# Patient Record
Sex: MALE | Race: WHITE | NOT HISPANIC OR LATINO | Employment: OTHER | URBAN - NONMETROPOLITAN AREA
[De-identification: names, ages, dates, MRNs, and addresses within clinical notes are randomized per-mention and may not be internally consistent; named-entity substitution may affect disease eponyms.]

---

## 2018-05-15 ENCOUNTER — APPOINTMENT (OUTPATIENT)
Dept: GENERAL RADIOLOGY | Facility: HOSPITAL | Age: 68
End: 2018-05-15

## 2018-05-15 ENCOUNTER — APPOINTMENT (OUTPATIENT)
Dept: CT IMAGING | Facility: HOSPITAL | Age: 68
End: 2018-05-15

## 2018-05-15 ENCOUNTER — HOSPITAL ENCOUNTER (EMERGENCY)
Facility: HOSPITAL | Age: 68
Discharge: HOME | End: 2018-05-15
Attending: EMERGENCY MEDICINE | Admitting: EMERGENCY MEDICINE

## 2018-05-15 VITALS
HEIGHT: 69 IN | SYSTOLIC BLOOD PRESSURE: 139 MMHG | HEART RATE: 75 BPM | WEIGHT: 186 LBS | DIASTOLIC BLOOD PRESSURE: 69 MMHG | TEMPERATURE: 98 F | BODY MASS INDEX: 27.55 KG/M2 | OXYGEN SATURATION: 95 % | RESPIRATION RATE: 20 BRPM

## 2018-05-15 DIAGNOSIS — S42.334A CLOSED NONDISPLACED OBLIQUE FRACTURE OF SHAFT OF RIGHT HUMERUS, INITIAL ENCOUNTER: Primary | ICD-10-CM

## 2018-05-15 PROCEDURE — 25010000002 TDAP 5-2.5-18.5 LF-MCG/0.5 SUSPENSION: Performed by: EMERGENCY MEDICINE

## 2018-05-15 PROCEDURE — 90715 TDAP VACCINE 7 YRS/> IM: CPT | Performed by: EMERGENCY MEDICINE

## 2018-05-15 PROCEDURE — 99283 EMERGENCY DEPT VISIT LOW MDM: CPT

## 2018-05-15 PROCEDURE — 73060 X-RAY EXAM OF HUMERUS: CPT

## 2018-05-15 PROCEDURE — 72170 X-RAY EXAM OF PELVIS: CPT

## 2018-05-15 PROCEDURE — 70450 CT HEAD/BRAIN W/O DYE: CPT

## 2018-05-15 PROCEDURE — 73030 X-RAY EXAM OF SHOULDER: CPT

## 2018-05-15 PROCEDURE — 90471 IMMUNIZATION ADMIN: CPT | Performed by: EMERGENCY MEDICINE

## 2018-05-15 RX ORDER — OXYCODONE HYDROCHLORIDE AND ACETAMINOPHEN 5; 325 MG/1; MG/1
1 TABLET ORAL EVERY 6 HOURS PRN
Qty: 12 TABLET | Refills: 0 | Status: SHIPPED | OUTPATIENT
Start: 2018-05-15 | End: 2018-08-23

## 2018-05-15 RX ADMIN — TETANUS TOXOID, REDUCED DIPHTHERIA TOXOID AND ACELLULAR PERTUSSIS VACCINE, ADSORBED 0.5 ML: 5; 2.5; 8; 8; 2.5 SUSPENSION INTRAMUSCULAR at 18:25

## 2018-05-15 NOTE — ED NOTES
No signs and symptoms of redness or local reaction noted to injection site.      Senait Malik RN  05/15/18 7304

## 2018-05-15 NOTE — ED PROVIDER NOTES
Subjective   History of Present Illness   67M w/ hx of COPD sent from clinic for further management of fall. Pt fell off ladder of about 12 feet and hit head, neck and R shoulder. Denies any loss of consciousness, subsequent vomiting, memory loss, changes in vision, weakness or numbness in arms or legs or use of blood thinners. However, clinic noted some slurring of speech on their exam. Pt only complains, now, of R shoulder and R groin pain. Last tetanus unknown      Review of Systems   Musculoskeletal: Positive for arthralgias.   Neurological: Positive for headaches.   All other systems reviewed and are negative.      Past Medical History:   Diagnosis Date   • COPD (chronic obstructive pulmonary disease)    • Hypertension        Allergies   Allergen Reactions   • Iodine Solution [Povidone Iodine] Itching       Past Surgical History:   Procedure Laterality Date   • BONE GRAFT Right     around age 12-14   • CORONARY STENT PLACEMENT Right 12/21/2012       History reviewed. No pertinent family history.    Social History     Social History   • Marital status: Single     Social History Main Topics   • Smoking status: Former Smoker   • Alcohol use Yes      Comment: rarely   • Drug use: No     Other Topics Concern   • Not on file           Objective   Physical Exam   Constitutional: He is oriented to person, place, and time. He appears well-developed and well-nourished. No distress.   HENT:   Head: Normocephalic and atraumatic.   Mouth/Throat: Oropharynx is clear and moist.   No hemotympanum, gaona's sign nor raccoon eyes.    Eyes: No scleral icterus.   Neck: Normal range of motion. Neck supple. No tracheal deviation present.   No ttp over entirety of spinous processes.    Cardiovascular: Normal rate, regular rhythm, normal heart sounds and intact distal pulses.  Exam reveals no gallop and no friction rub.    No murmur heard.  Pulmonary/Chest: Effort normal and breath sounds normal. No stridor. No respiratory distress.  He has no wheezes. He has no rales. He exhibits no tenderness.   Abdominal: Soft. He exhibits no distension and no mass. There is no tenderness. There is no rebound and no guarding.   Musculoskeletal: He exhibits tenderness (R anterior shoulder ttp. ). He exhibits no deformity.   Range of motion of the right shoulder is limited to about 30° of abduction.  Tenderness palpation of the right anterior shoulder as well as the proximal humerus.  Mild pain in the right groin with flexion of the right hip.  Otherwise no tenderness palpation or pain with range of motion of bilateral upper and lower extremities.   Neurological: He is alert and oriented to person, place, and time.   Skin: Skin is warm and dry. No rash noted. He is not diaphoretic. No erythema. No pallor.   Psychiatric: He has a normal mood and affect. His behavior is normal.   Nursing note and vitals reviewed.      Procedures           ED Course  ED Course                  MDM  67-year-old male here after fall.  Complains only of pain in the right shoulder as well as right groin.  Given age and height of fall, will get a CT scan of the head, x-rays of the right shoulder, humerus, pelvis, update tetanus, and reassess.    5:57 PM Xrays show only midshaft humerus fracture, non-displaced. Awaiting CT head.     6:25 PM CT head unremarkable. Will place in a sling and have pt f/u w/ orthopedics as needed for reassessment. Discussed return to care precautions.     Final diagnoses:   Closed nondisplaced oblique fracture of shaft of right humerus, initial encounter            Torin Porter MD  05/15/18 5002

## 2018-08-23 ENCOUNTER — APPOINTMENT (OUTPATIENT)
Dept: PREADMISSION TESTING | Facility: HOSPITAL | Age: 68
End: 2018-08-23

## 2018-08-23 ENCOUNTER — HOSPITAL ENCOUNTER (OUTPATIENT)
Dept: GENERAL RADIOLOGY | Facility: HOSPITAL | Age: 68
Discharge: HOME | End: 2018-08-23
Attending: ORTHOPAEDIC SURGERY | Admitting: ORTHOPAEDIC SURGERY

## 2018-08-23 VITALS
DIASTOLIC BLOOD PRESSURE: 60 MMHG | BODY MASS INDEX: 25.77 KG/M2 | HEIGHT: 70 IN | WEIGHT: 180 LBS | OXYGEN SATURATION: 98 % | SYSTOLIC BLOOD PRESSURE: 134 MMHG

## 2018-08-23 LAB
ANION GAP SERPL CALCULATED.3IONS-SCNC: 13 MMOL/L (ref 10–20)
BUN BLD-MCNC: 19 MG/DL (ref 7–20)
BUN/CREAT SERPL: 27.1 (ref 6.3–21.9)
CALCIUM SPEC-SCNC: 9.4 MG/DL (ref 8.4–10.2)
CHLORIDE SERPL-SCNC: 106 MMOL/L (ref 98–107)
CO2 SERPL-SCNC: 23 MMOL/L (ref 26–30)
CREAT BLD-MCNC: 0.7 MG/DL (ref 0.6–1.3)
DEPRECATED RDW RBC AUTO: 42.6 FL (ref 37–54)
ERYTHROCYTE [DISTWIDTH] IN BLOOD BY AUTOMATED COUNT: 13.2 % (ref 11.5–14.5)
GFR SERPL CREATININE-BSD FRML MDRD: 112 ML/MIN/1.73
GLUCOSE BLD-MCNC: 100 MG/DL (ref 74–98)
HCT VFR BLD AUTO: 40.9 % (ref 42–52)
HGB BLD-MCNC: 14.1 G/DL (ref 14–18)
MCH RBC QN AUTO: 30.5 PG (ref 27–31)
MCHC RBC AUTO-ENTMCNC: 34.5 G/DL (ref 30–37)
MCV RBC AUTO: 88.3 FL (ref 80–94)
PLATELET # BLD AUTO: 146 10*3/MM3 (ref 130–400)
PMV BLD AUTO: 9.5 FL (ref 6–12)
POTASSIUM BLD-SCNC: 4 MMOL/L (ref 3.5–5.1)
RBC # BLD AUTO: 4.63 10*6/MM3 (ref 4.7–6.1)
SODIUM BLD-SCNC: 138 MMOL/L (ref 137–145)
WBC NRBC COR # BLD: 6.13 10*3/MM3 (ref 4.8–10.8)

## 2018-08-23 PROCEDURE — 85027 COMPLETE CBC AUTOMATED: CPT | Performed by: ORTHOPAEDIC SURGERY

## 2018-08-23 PROCEDURE — 71046 X-RAY EXAM CHEST 2 VIEWS: CPT

## 2018-08-23 PROCEDURE — 80048 BASIC METABOLIC PNL TOTAL CA: CPT | Performed by: ORTHOPAEDIC SURGERY

## 2018-08-23 PROCEDURE — 36415 COLL VENOUS BLD VENIPUNCTURE: CPT

## 2018-08-23 PROCEDURE — 93005 ELECTROCARDIOGRAM TRACING: CPT | Performed by: ORTHOPAEDIC SURGERY

## 2018-08-23 RX ORDER — AMLODIPINE BESYLATE 10 MG/1
10 TABLET ORAL DAILY
COMMUNITY
End: 2020-11-02

## 2018-08-23 NOTE — DISCHARGE INSTRUCTIONS
PAT PASS GIVEN/REVIEWED WITH PT.  VERBALIZED UNDERSTANDING OF THE FOLLOWING:  DO NOT EAT, DRINK, SMOKE, USE SMOKELESS TOBACCO OR CHEW GUM AFTER MIDNIGHT THE NIGHT BEFORE SURGERY.  THIS ALSO INCLUDES HARD CANDIES AND MINTS.    DO NOT SHAVE THE AREA TO BE OPERATED ON AT LEAST 48 HOURS PRIOR TO THE PROCEDURE.  DO NOT WEAR MAKE UP OR NAIL POLISH.  DO NOT LEAVE IN ANY PIERCING OR WEAR JEWELRY THE DAY OF SURGERY.      DO NOT USE ADHESIVES IF YOU WEAR DENTURES.    DO NOT WEAR EYE CONTACTS; BRING IN YOUR GLASSES.    ONLY TAKE MEDICATION THE MORNING OF YOUR PROCEDURE IF INSTRUCTED BY YOUR SURGEON WITH ENOUGH WATER TO SWALLOW THE MEDICATION.  IF YOUR SURGEON DID NOT SPECIFY WHICH MEDICATIONS TO TAKE, YOU WILL NEED TO CALL THEIR OFFICE FOR FURTHER INSTRUCTIONS AND DO AS THEY INSTRUCT.    LEAVE ANYTHING YOU CONSIDER VALUABLE AT HOME.    YOU WILL NEED TO ARRANGE FOR SOMEONE TO DRIVE YOU HOME AFTER SURGERY.  IT IS RECOMMENDED THAT YOU DO NOT DRIVE, WORK, DRINK ALCOHOL OR MAKE MAJOR DECISIONS FOR AT LEAST 24 HOURS AFTER YOUR PROCEDURE IS COMPLETE.      THE DAY OF YOUR PROCEDURE, BRING IN THE FOLLOWING IF APPLICABLE:   PICTURE ID AND INSURANCE/MEDICARE OR MEDICAID CARDS   COPY OF ADVANCED DIRECTIVE/LIVING WILL/POWER OR    CPAP/BIPAP/INHALERS   SKIN PREP SHEET   YOUR PREADMISSION TESTING PASS (IF NOT A PHONE HISTORY)        Chlorhexidine wipes along with instruction/verification sheet given to pt.  Instructed pt to apply stickers from chlorhexidine wipes to verification sheet once skin prep has been  completed, and to return to [facility] the day of the procedure.  Pt. Verbalizes understanding.

## 2018-08-23 NOTE — PAT
"CALLED MARCIA MORENO CRNA R/T PT PRELIMINARY EKG.  NOTIFIED THAT PT HAD NO PRIOR EKG FOR COMPARISON.  WILL FAX FOR RECORDS TO DR. SCHMIDT'S OFFICE IN [place], [place].  PT STATES HE HAD AN MI IN 2012 AND HAD 2 STENTS PLACED.  PT DOES STATE THAT DR. CHENG DID REQUIRE HIM TO GET CARDIAC CLEARANCE FOR THIS PROCEDURE.  MARCIA STATED, \"GOOD, HE NEEDS ONE\".  ASKED IF ANYTHING FURTHER NEEDED, AND MARCIA STATED \"NO, WE'RE GOOD\".    "

## 2018-09-04 ENCOUNTER — ANESTHESIA EVENT (OUTPATIENT)
Dept: PERIOP | Facility: HOSPITAL | Age: 68
End: 2018-09-04

## 2018-09-05 ENCOUNTER — HOSPITAL ENCOUNTER (OUTPATIENT)
Facility: HOSPITAL | Age: 68
Setting detail: HOSPITAL OUTPATIENT SURGERY
Discharge: HOME | End: 2018-09-05
Attending: ORTHOPAEDIC SURGERY | Admitting: NURSE ANESTHETIST, CERTIFIED REGISTERED

## 2018-09-05 ENCOUNTER — ANESTHESIA (OUTPATIENT)
Dept: PERIOP | Facility: HOSPITAL | Age: 68
End: 2018-09-05

## 2018-09-05 VITALS
OXYGEN SATURATION: 96 % | HEART RATE: 70 BPM | TEMPERATURE: 97.8 F | RESPIRATION RATE: 18 BRPM | SYSTOLIC BLOOD PRESSURE: 110 MMHG | DIASTOLIC BLOOD PRESSURE: 58 MMHG

## 2018-09-05 PROCEDURE — 25010000002 KETOROLAC TROMETHAMINE PER 15 MG: Performed by: NURSE ANESTHETIST, CERTIFIED REGISTERED

## 2018-09-05 PROCEDURE — C1713 ANCHOR/SCREW BN/BN,TIS/BN: HCPCS | Performed by: ORTHOPAEDIC SURGERY

## 2018-09-05 PROCEDURE — 25010000002 FENTANYL CITRATE (PF) 100 MCG/2ML SOLUTION: Performed by: NURSE ANESTHETIST, CERTIFIED REGISTERED

## 2018-09-05 PROCEDURE — 25010000002 SUCCINYLCHOLINE PER 20 MG: Performed by: NURSE ANESTHETIST, CERTIFIED REGISTERED

## 2018-09-05 PROCEDURE — 25010000002 MIDAZOLAM PER 1 MG: Performed by: NURSE ANESTHETIST, CERTIFIED REGISTERED

## 2018-09-05 PROCEDURE — 25010000003 CEFAZOLIN PER 500 MG: Performed by: ORTHOPAEDIC SURGERY

## 2018-09-05 PROCEDURE — 25010000002 PROPOFOL 200 MG/20ML EMULSION: Performed by: NURSE ANESTHETIST, CERTIFIED REGISTERED

## 2018-09-05 PROCEDURE — 25010000002 ONDANSETRON PER 1 MG: Performed by: NURSE ANESTHETIST, CERTIFIED REGISTERED

## 2018-09-05 PROCEDURE — 25010000002 DEXAMETHASONE PER 1 MG: Performed by: NURSE ANESTHETIST, CERTIFIED REGISTERED

## 2018-09-05 RX ORDER — FENTANYL CITRATE 50 UG/ML
INJECTION, SOLUTION INTRAMUSCULAR; INTRAVENOUS AS NEEDED
Status: DISCONTINUED | OUTPATIENT
Start: 2018-09-05 | End: 2018-09-05 | Stop reason: SURG

## 2018-09-05 RX ORDER — BUPIVACAINE HYDROCHLORIDE 5 MG/ML
INJECTION, SOLUTION EPIDURAL; INTRACAUDAL
Status: DISCONTINUED
Start: 2018-09-05 | End: 2018-09-05 | Stop reason: HOSPADM

## 2018-09-05 RX ORDER — DEXAMETHASONE SODIUM PHOSPHATE 4 MG/ML
INJECTION, SOLUTION INTRA-ARTICULAR; INTRALESIONAL; INTRAMUSCULAR; INTRAVENOUS; SOFT TISSUE AS NEEDED
Status: DISCONTINUED | OUTPATIENT
Start: 2018-09-05 | End: 2018-09-05 | Stop reason: SURG

## 2018-09-05 RX ORDER — PROPOFOL 10 MG/ML
INJECTION, EMULSION INTRAVENOUS AS NEEDED
Status: DISCONTINUED | OUTPATIENT
Start: 2018-09-05 | End: 2018-09-05 | Stop reason: SURG

## 2018-09-05 RX ORDER — KETOROLAC TROMETHAMINE 30 MG/ML
INJECTION, SOLUTION INTRAMUSCULAR; INTRAVENOUS AS NEEDED
Status: DISCONTINUED | OUTPATIENT
Start: 2018-09-05 | End: 2018-09-05 | Stop reason: SURG

## 2018-09-05 RX ORDER — MEPERIDINE HYDROCHLORIDE 50 MG/ML
25 INJECTION INTRAMUSCULAR; INTRAVENOUS; SUBCUTANEOUS
Status: DISCONTINUED | OUTPATIENT
Start: 2018-09-05 | End: 2018-09-05 | Stop reason: HOSPADM

## 2018-09-05 RX ORDER — ACETAMINOPHEN 500 MG
1000 TABLET ORAL ONCE
Status: COMPLETED | OUTPATIENT
Start: 2018-09-05 | End: 2018-09-05

## 2018-09-05 RX ORDER — ROCURONIUM BROMIDE 10 MG/ML
INJECTION, SOLUTION INTRAVENOUS AS NEEDED
Status: DISCONTINUED | OUTPATIENT
Start: 2018-09-05 | End: 2018-09-05 | Stop reason: SURG

## 2018-09-05 RX ORDER — DEXAMETHASONE SODIUM PHOSPHATE 10 MG/ML
INJECTION, SOLUTION INTRAMUSCULAR; INTRAVENOUS
Status: DISCONTINUED
Start: 2018-09-05 | End: 2018-09-05 | Stop reason: HOSPADM

## 2018-09-05 RX ORDER — FENTANYL CITRATE 50 UG/ML
INJECTION, SOLUTION INTRAMUSCULAR; INTRAVENOUS
Status: COMPLETED
Start: 2018-09-05 | End: 2018-09-05

## 2018-09-05 RX ORDER — GLYCOPYRROLATE 0.2 MG/ML
INJECTION INTRAMUSCULAR; INTRAVENOUS AS NEEDED
Status: DISCONTINUED | OUTPATIENT
Start: 2018-09-05 | End: 2018-09-05 | Stop reason: SURG

## 2018-09-05 RX ORDER — ONDANSETRON 2 MG/ML
4 INJECTION INTRAMUSCULAR; INTRAVENOUS ONCE AS NEEDED
Status: DISCONTINUED | OUTPATIENT
Start: 2018-09-05 | End: 2018-09-05 | Stop reason: HOSPADM

## 2018-09-05 RX ORDER — ONDANSETRON 2 MG/ML
INJECTION INTRAMUSCULAR; INTRAVENOUS AS NEEDED
Status: DISCONTINUED | OUTPATIENT
Start: 2018-09-05 | End: 2018-09-05 | Stop reason: SURG

## 2018-09-05 RX ORDER — OXYCODONE HYDROCHLORIDE AND ACETAMINOPHEN 5; 325 MG/1; MG/1
1-2 TABLET ORAL EVERY 4 HOURS PRN
Qty: 50 TABLET | Refills: 0 | Status: SHIPPED | OUTPATIENT
Start: 2018-09-05 | End: 2020-12-14

## 2018-09-05 RX ORDER — SUCCINYLCHOLINE CHLORIDE 20 MG/ML
INJECTION INTRAMUSCULAR; INTRAVENOUS AS NEEDED
Status: DISCONTINUED | OUTPATIENT
Start: 2018-09-05 | End: 2018-09-05 | Stop reason: SURG

## 2018-09-05 RX ORDER — SODIUM CHLORIDE, SODIUM LACTATE, POTASSIUM CHLORIDE, CALCIUM CHLORIDE 600; 310; 30; 20 MG/100ML; MG/100ML; MG/100ML; MG/100ML
1000 INJECTION, SOLUTION INTRAVENOUS CONTINUOUS
Status: DISCONTINUED | OUTPATIENT
Start: 2018-09-05 | End: 2018-09-05 | Stop reason: HOSPADM

## 2018-09-05 RX ORDER — LIDOCAINE HYDROCHLORIDE AND EPINEPHRINE 10; 10 MG/ML; UG/ML
INJECTION, SOLUTION INFILTRATION; PERINEURAL AS NEEDED
Status: DISCONTINUED | OUTPATIENT
Start: 2018-09-05 | End: 2018-09-05 | Stop reason: HOSPADM

## 2018-09-05 RX ORDER — BUPIVACAINE HCL/0.9 % NACL/PF 0.125 %
4-14 PREFILLED PUMP RESERVOIR EPIDURAL CONTINUOUS
Status: DISCONTINUED | OUTPATIENT
Start: 2018-09-05 | End: 2018-09-05 | Stop reason: HOSPADM

## 2018-09-05 RX ORDER — MIDAZOLAM HYDROCHLORIDE 1 MG/ML
INJECTION INTRAMUSCULAR; INTRAVENOUS
Status: COMPLETED
Start: 2018-09-05 | End: 2018-09-05

## 2018-09-05 RX ORDER — MIDAZOLAM HYDROCHLORIDE 1 MG/ML
INJECTION INTRAMUSCULAR; INTRAVENOUS AS NEEDED
Status: DISCONTINUED | OUTPATIENT
Start: 2018-09-05 | End: 2018-09-05 | Stop reason: SURG

## 2018-09-05 RX ADMIN — SODIUM CHLORIDE, POTASSIUM CHLORIDE, SODIUM LACTATE AND CALCIUM CHLORIDE 1000 ML: 600; 310; 30; 20 INJECTION, SOLUTION INTRAVENOUS at 06:34

## 2018-09-05 RX ADMIN — FENTANYL CITRATE 50 MCG: 50 INJECTION, SOLUTION INTRAMUSCULAR; INTRAVENOUS at 08:31

## 2018-09-05 RX ADMIN — Medication 6 ML/HR: at 09:55

## 2018-09-05 RX ADMIN — KETOROLAC TROMETHAMINE 30 MG: 30 INJECTION, SOLUTION INTRAMUSCULAR at 08:38

## 2018-09-05 RX ADMIN — ROCURONIUM BROMIDE 10 MG: 10 INJECTION INTRAVENOUS at 08:31

## 2018-09-05 RX ADMIN — DEXAMETHASONE SODIUM PHOSPHATE 8 MG: 4 INJECTION, SOLUTION INTRAMUSCULAR; INTRAVENOUS at 08:38

## 2018-09-05 RX ADMIN — ACETAMINOPHEN 1000 MG: 500 TABLET, FILM COATED ORAL at 07:06

## 2018-09-05 RX ADMIN — CEFAZOLIN SODIUM 1 G: 1 SOLUTION INTRAVENOUS at 08:23

## 2018-09-05 RX ADMIN — EPHEDRINE SULFATE 10 MG: 50 INJECTION INTRAMUSCULAR; INTRAVENOUS; SUBCUTANEOUS at 08:48

## 2018-09-05 RX ADMIN — GLYCOPYRROLATE 0.2 MG: 0.2 INJECTION, SOLUTION INTRAMUSCULAR; INTRAVENOUS at 08:55

## 2018-09-05 RX ADMIN — PROPOFOL 200 MG: 10 INJECTION, EMULSION INTRAVENOUS at 08:31

## 2018-09-05 RX ADMIN — FENTANYL CITRATE 50 MCG: 50 INJECTION, SOLUTION INTRAMUSCULAR; INTRAVENOUS at 07:26

## 2018-09-05 RX ADMIN — EPHEDRINE SULFATE 10 MG: 50 INJECTION INTRAMUSCULAR; INTRAVENOUS; SUBCUTANEOUS at 08:55

## 2018-09-05 RX ADMIN — ONDANSETRON 4 MG: 2 INJECTION INTRAMUSCULAR; INTRAVENOUS at 08:38

## 2018-09-05 RX ADMIN — ROCURONIUM BROMIDE 10 MG: 10 INJECTION INTRAVENOUS at 08:56

## 2018-09-05 RX ADMIN — MIDAZOLAM HYDROCHLORIDE 2 MG: 1 INJECTION, SOLUTION INTRAMUSCULAR; INTRAVENOUS at 07:26

## 2018-09-05 RX ADMIN — SODIUM CHLORIDE, POTASSIUM CHLORIDE, SODIUM LACTATE AND CALCIUM CHLORIDE: 600; 310; 30; 20 INJECTION, SOLUTION INTRAVENOUS at 09:24

## 2018-09-05 RX ADMIN — SUCCINYLCHOLINE CHLORIDE 100 MG: 20 INJECTION, SOLUTION INTRAMUSCULAR; INTRAVENOUS at 08:31

## 2018-09-05 RX ADMIN — LIDOCAINE HYDROCHLORIDE 80 MG: 20 INJECTION, SOLUTION INTRAVENOUS at 08:31

## 2018-09-05 NOTE — ANESTHESIA PREPROCEDURE EVALUATION
Anesthesia Evaluation     Patient summary reviewed and Nursing notes reviewed   no history of anesthetic complications:  NPO Solid Status: > 8 hours  NPO Liquid Status: > 8 hours           Airway   Mallampati: I  TM distance: >3 FB  Neck ROM: full  No difficulty expected  Dental - normal exam   (+) upper dentures and partials    Pulmonary    (+) a smoker Former, COPD, asthma (As child),   Cardiovascular - normal exam    ECG reviewed    (+) hypertension 2 medications or greater, past MI (2012 MI and two stents) , cardiac stents (2 Stents) more than 12 months ago hyperlipidemia,     ROS comment: EKG Sinus Bradycardia, 53, LBBB    Neuro/Psych- negative ROS  GI/Hepatic/Renal/Endo - negative ROS     Musculoskeletal     Abdominal    Substance History - negative use     OB/GYN negative ob/gyn ROS         Other   (+) arthritis     ROS/Med Hx Other: H/H 14.1/40.9  K 4.0  CxR NAD   Cardiac clearance in chart                Anesthesia Plan    ASA 3     general   (Risks and benefits of general anesthesia discussed including risk of aspiration, recall, dental damage, cardiac or respiratory compromise, or death. All patient questions answered.  Patient told a breathing tube will be used to manage the airway.    Risks and benefits of peripheral nerve blocks for pain control explained to patient to include infection, bleeding at the site, paresthesia, damage to nerves, and incomplete analgesia.  Plan interscalene OnQ catheter for POPC    Will continue with plan of care.)  intravenous induction   Anesthetic plan, all risks, benefits, and alternatives have been provided, discussed and informed consent has been obtained with: patient.

## 2018-09-05 NOTE — ANESTHESIA PROCEDURE NOTES
Airway  Urgency: elective    Airway not difficult    General Information and Staff    Patient location during procedure: OR  CRNA: HUGH MORENO    Indications and Patient Condition  Indications for airway management: airway protection    Preoxygenated: yes  Mask difficulty assessment: 0 - not attempted    Final Airway Details  Final airway type: endotracheal airway      Successful airway: ETT  Cuffed: yes   Successful intubation technique: direct laryngoscopy  Facilitating devices/methods: intubating stylet  Endotracheal tube insertion site: oral  Blade: Groves  Blade size: 2  ETT size: 7.0 mm  Cormack-Lehane Classification: grade I - full view of glottis  Placement verified by: chest auscultation and capnometry   Measured from: lips  ETT to lips (cm): 21  Number of attempts at approach: 1    Additional Comments  Dentition and Lips as preoperative assesment

## 2018-09-05 NOTE — DISCHARGE INSTRUCTIONS
"Please follow all post op instructions and follow up appointment time from your physician's office included in your discharge packet.    Keep the affected extremity elevated above  level of the heart.  Use your ice pack as instructed, do not use continuously.  Use your crutches and or sling as directed  Follow your physicians instructions as previously directed.      No pushing, pulling, tugging,  heavy lifting, or strenuous activity.  No major decision making, driving, or drinking alcoholic beverages for 24 hours. ( due to the medications you have  received)  Always use good hand hygiene/washing techniques.  NO driving while taking pain medications.      To assist you in voiding:  Drink plenty of fluids  Listen to running water while attempting to void.    If you are unable to urinate and you have an uncomfortable urge to void or it has been   6 hours since you were discharged, return to the Emergency Room      ON-Q PATIENT INSTRUCTIONS    You have had regional anesthesia with a catheter as part of your anesthetic care.    Because of this your extremity may be numb and very weak.  You must protect   your extremity.  Wear the sling if your surgeon has given you one.  Take care to   avoid hot, very cold or sharp items.  As the block wears off, you may have a tingling   or a \"pins and needles\" sensation in your arm and hand.  This is normal.    The ON-Q pain pump is infusing medicine all the time which will help control your   pain as the block wears off.  Your extremity may not be as numb after the first 12 to   18 hours.    Do not tug on or kink the catheter.  Keep the insertion site into the skin and any   connections clean.    CALL ANESTHESIA  IMMEDIATELY FOR:     -A metallic taste in your mouth       -Ringing in the ears        -Persistent tremors or shakes or a seizure      -Redness, pain or swelling at the catheter insertion site    -A cold, dusky or dark extremity   -Severe pain and you can't move your fingers " or toes    The ON-Q pain ball is initially set at _____  mL/hour. The black arrow at the top of the   dial points to the rate the medication is being delivered. Do not set the pump in between   the numbers as it will not work correctly. The white clamp must be open at all times.    If you are having pain, increase the pain ball rate  to 14 ml/hour for ONE hour.   Then return to your original rate, or if pain is not adequately relieved you may increase it by 2mL/hour.  If your extremity is too numb, you may turn down the pain ball 2 mL/hour every 2 hours.    Do not titrate down too fast; you may then experience pain.    You may also take the prescribed pain medication from your doctor.     The pain ball and catheter may stay in up to 4 days.    Leaking at the catheter site may occur.  The pain ball is still working if it's controlling your pain.    Reinforce the dressing with additional tegaderm.    When the pain ball is empty it will be flat.  Remove the catheter by pulling off the dressing slowly   and pull the catheter out of the skin.  Confirm that the tip of the catheter is intact once removed.   If the catheter is stuck but not hurting, reposition your extremity and keep pulling slowly until   removed.  If the catheter is hurting and won't pull out,   CALL THE NUMBER BELOW:    DO NOT CUT THE CATHETER FOR ANY REASON    When treatment is completed, dispose of the catheter and pain ball.    PRIMARY CONTACT: Anesthesia Service   (Mon-Fri 7:00 AM-3:00 PM): [phone]    After 3:00 PM: [phone] (Tell the hospital  you have a pain pump and need  to speak with anesthesia)      Frequently Asked questions about Pain Blocks    1. What are the advantages of having a nerve block?    A nerve block decreases the pain after surgery and lessens the need for narcotics. Narcotics cause nausea, vomiting, sleepiness, constipation and delayed mobility.  2. Will the procedure hurt?   You will be given sedation for the  procedure. We need you to be alert enough to follow instructions during the block.  3. Am I required to have a nerve block?    No, this is a service that we offer to improve comfort and reduce pain medication requirement following surgery. You can refuse to have a nerve block.      Single Injection    1. Is it normal for my extremity to be numb after 16 hours?  Yes.  Weakness and numbness can last 24 hours or more. If you are concerned call Anesthesia.     2. After shoulder surgery my eyelid on the same side as my surgery Is dropping. Is this normal?   The medication injected may contact nerves that affect your eyelid and cause drooping. This will wear off as the pain block wears. If you are concerned call Anesthesia.    3. After shoulder surgery it feels like it is hard to take a deep breath. Is this normal?   Yes.  This will go away as the medication for the block wears off. If you are extremely short of breath call 911.      Continuous Infusion with OnQ Pump    1. Who do I call if I  have a question or concern about the OnQ pump?  [employer] [phone]  (24-hour product support)    2. Can I get the clear dressing wet when Itake a shower?  No. This dressing must remain dry or It will loosen and the catheter may come out.    3. Does the catheter need to be removed immediately after the pain ball is empty?   No. The empty pain pump can remain in place until it is convenient to be removed. However, It is important that the catheter does get removed as soon as possible after completion.    4. If I decide I don't like the catheter after it Is placed, do I have to wait for the pain ball to go flat before Ican remove it?   No the catheter can be removed at anytime . Once it is removed it cannot be replaced.    5. If I accidentally pull the catheter out, will I be causing any problems?   No. Be aware that the pain block will wear off in 2-4 hours so you must begin taking pain medication as prescribed.     6. How do I  know if the medication Is infusing?   You pain ball will begin to shrink as the medication goes in. Then after 24 hours you will notice that the pain ball begins to get smaller.    7. What do I do If I notice clear or pink colored drainage collecting under the dressing?    Drainage around the catheter site is normal and can be clear, pink, and sometimes red. You can reinforce the dressing with anything that will absorb drainage.    8. When I remove the catheter should I expect some bleeding?   Yes. It is not uncommon for small amount of bleeding to occur after the catheter is removed. Apply direct pressure for 5 minutes and cover with a Band-Aid.    9. Will I need to take the pain medication ordered by my surgeon?   If you are going home on the day of surgery get your prescription filled. The pain ball will help decrease the need for pain medications but sometimes it is  necessary to take prescribed pain medication. If you are staying in the hospital overnight, you must communicate with your nurse about your pain level.          I have received a copy these instructions.     __________________________________________________________       Patient Signature    *Please ensure that patient receives a copy and a signed copy is placed in chart*.

## 2018-09-05 NOTE — ANESTHESIA PROCEDURE NOTES
Peripheral Block    Patient location during procedure: pre-op  Start time: 9/5/2018 7:26 AM  Stop time: 9/5/2018 7:39 AM  Reason for block: at surgeon's request and post-op pain management  Preanesthetic Checklist  Completed: patient identified, site marked, surgical consent, pre-op evaluation, timeout performed, IV checked, risks and benefits discussed and monitors and equipment checked  Prep:  Pt Position: sitting  Sterile barriers:cap, gloves, mask and sterile barriers  Prep: ChloraPrep  Patient monitoring: blood pressure monitoring, continuous pulse oximetry and EKG  Procedure  Sedation:yes    Guidance:ultrasound guided and nerve stimulator  ULTRASOUND INTERPRETATION. Using ultrasound guidance a gauge needle was placed in close proximity to the brachial plexus nerve, at which point, under ultrasound guidance anesthetic was injected in the area of the nerve and spread of the anesthesia was seen on ultrasound in close proximity thereto.  There were no abnormalities seen on ultrasound; a digital image was taken; and the patient tolerated the procedure with no complications. Images:still images obtained  Loss of twitch: 0.5 mA  Laterality:right  Block Type:interscalene  Injection Technique:catheter  Needle Type:echogenic  Needle Gauge:18 G  Resistance on Injection: none  Catheter Size:20 G  Medications  Comment:Adjuncts per total volume of LA:    Decadron 10 mg PSF      If required, intravenous sedation was given -- see meds on anesthesia record.  Local Injected:bupivacaine 0.5% Local Amount Injected:20mL  Post Assessment  Injection Assessment: negative aspiration for heme, no paresthesia on injection and incremental injection  Patient Tolerance:comfortable throughout block  Complications:no  Additional Notes  Procedure:                CATHETER INTERSCALENE                                                                        Catheter at skin-4                                       Patient analgesia was achieved  with IV Sedation( see meds)      The pt was placed in semi-fowlers position with a slight tilt of the thorax contralateral to the insertion site.  The Insertion Site was prepped and draped in sterile fashion.  The skin was anesthetized with Lidocaine 1% 1ml injection utilizing a 25g needle.  Utilizing ultrasound guidance, a I-Flow 18 ga echogenic needle was advanced in-plane.  Major vessels (carotid and Internal Jugular) were visualized as the brachial plexus was approached at the approximate level of C-7/ T-1.  Cervical 5 and Branches of Cervical 6 nerve roots were visualized and the needle tip was placed posterior at the level of C-6 roots.  LA spread was visualized and injection was made incrementally every 5 mls with aspiration. Injection pressure was normal or little; there was no intraneural injection, no vascular injection.      The I-Flow 20  catheter was then placed under ultrasound guidance on the posterior aspect of the Brachial Plexus. Location of catheter was confirmed with NS or air injection visualized with ultrasound . The needle was then removed and the skin was sealed with Skin Affiix at catheter insertion site.  Skin was prepped with benzoin or mastisol and the labeled curled catheter was secured with steristrips and a transparent dressing.

## 2018-09-05 NOTE — OP NOTE
[address], [address]  [place]  [address] [phone]      OPERATIVE REPORT      PATIENT NAME:  Emir Tian                            YOB: 1950       PREOP DIAGNOSIS:   Right  shoulder impingement,     subacromial bursitis, acromioclavicular osteoarthritis and     rotator cuff tear. Labral tear    POSTOP DIAGNOSIS:  Same    PROCEDURE:    Right  shoulder diagnostic     arthroscopy, subacromial decompression bursectomy,     acromioplasty, distal clavicle excision and mini-open     rotator cuff repair. Biceps tenotomy    SURGEON:     Jermaine Sales MD    OPERATIVE TEAM:   Circulator: Denisse Castellon RN; Esteban Burgos RN  Scrub Person: Annemarie Nguyễn Misty    ANESTHETIST:   YOLETTE: Chad Godinez CRNA    ANESTHESIA:  General          FINDINGS:     Medium size  cm mildly retracted     complete supraspinatus tear, moderate bursitis, type 3     acromion spur and hypertrphic acromioclavicular joint     advanced osteoarthritis with spurs. Labral tear with biceps tear    SPECIMENS:    None.        COMPLICATIONS:    None.    DISPOSITION:    Stable to recovery.     INDICATIONS:     Shoulder pain, stiffness, weakness and dysfunction.    NARRATIVE:    Risks, benefits of proposed treatment and alternative options discussed and an informed consent for the elective surgical procedure obtained.  Risks discussed including but not limited to anesthesia, infection, nerve/vessel/tendon injury, fracture, DVT, PE, recurrent tear and further symptoms or limitations.  Goals outlined including the potential for relief of pain and improved shoulder function and activity tolerance.    Antibiotic prophylaxis was given.  A time out was performed prior to the procedure.  Anesthesia was effective and well-tolerated.  The patient was maintained in the modified beach chair position with care taken to pad all areas and keep the away arm at and above the level of the  atrium for DVT prophylaxis.  The shoulder, arm and hand was prepped and draped in the usual sterile fashion.  At the start of the procedure, a local injection was given at the portal sites and at the end of the procedure a shoulder injection given for post-op pain control.    Portal sites were made for the arthroscopic portion of the procedure.  Evaluation of the glenohumeral joint space revealed 0 degenerative changes diffusely, inspection of rotator cuff revealed tear through superspinatus full thickness, inspection of subscapularis revealsed intact  inspection of labrum and biceps tendon showed tearing through labrum and biceps tendon   This was addressed with debridement of labrum and biceps tenotomy    Attention was changed to subacromial space, there was considerable bursitis, anterolateral downward sloping type 3 acromion morphology and hypertrophic spurs of advanced acromioclavicular joint osteoarthritis.  Arthroscopic treatment consisted of debridement of soft tissue undersurface of acromion.  A subacromial decompression was performed with jose eduardo starting anterorlateral working medial and posterior about 1 cm completing the debridement.   Evaluating distal clavicle showed significant degenerative changes   A jose eduardo was utilized to remove the distal 6 mm of clavicle releiving the degenerative changes.   Attention to rotator cuff was performed with footprint debrided to prepare for healing.  The rotator cuff was then repaired using 4 simple suture and 2 anchors off the articular margin.  Stable fixation was noted.      At the end of the procedure, the shoulder was irrigated well and suctioned clear.  Routine closure of the portal sites.  A sterile dressing was applied and a shoulder immobilizer placed for support, protection and comfort.   Anesthesia was effective and well tolerated.  There were no complications of the procedure. The patient was transferred stable to recovery.

## 2018-09-05 NOTE — ANESTHESIA POSTPROCEDURE EVALUATION
Patient: Emir Tian    Procedure Summary     Date:  09/05/18 Room / Location:  [facility]  / [facility]    Anesthesia Start:  0823 Anesthesia Stop:      Procedure:  SHOULDER ARTHROSCOPY RIGHT WITH SUBACROMIAL DECOMPRESSION, DISTAL CLAVICLE EXCISION, ROTATOR CUFF REPAIR AND LABRAL REPAIR VS BICEPS TENODESIS (Right Shoulder) Diagnosis:       Complete tear of right rotator cuff      Superior glenoid labrum lesion of right shoulder      (Complete tear of right rotator cuff [M75.121])      (Superior glenoid labrum lesion of right shoulder [S43.431A])    Surgeon:  Dharmesh Sales MD Provider:  Chad Godinez CRNA    Anesthesia Type:  general ASA Status:  3          Anesthesia Type: general  Last vitals  BP   107/51   Temp   97.7   Pulse   94   Resp   18   SpO2   100     Post Anesthesia Care and Evaluation    Patient location during evaluation: PACU  Patient participation: complete - patient participated  Level of consciousness: awake and alert  Pain score: 0  Pain management: satisfactory to patient  Airway patency: patent  Anesthetic complications: No anesthetic complications  PONV Status: none  Cardiovascular status: acceptable and stable  Respiratory status: acceptable, room air and face mask  Hydration status: acceptable

## 2018-09-06 NOTE — PROGRESS NOTES
GORAN Peters    Nerve Cath Post Op Call    Patient Name: Emir Tian  :  1950  MRN:  [id number]  Date of Discharge: 2018    Nerve Cath Post Op Call:    Analgesia:Good  Pain Score:4/10  Side Effects:None  Catheter Site:clean  Patient Controlled ON Q pump infusion rate: 8ml/hr  Catheter Plan:Will continue with plan at home without changes    Pt stated he had no pain until this morning. He raised the rate to 14 for one hour then backed down to 8 ml/hr. Says it is much better now. Told him we would call him tomorrow

## 2018-09-06 NOTE — PROGRESS NOTES
GORAN Peters    Nerve Cath Post Op Call    Patient Name: Emir Tian  :  1950  MRN:  [id number]  Date of Discharge: 2018    Nerve Cath Post Op Call:    Catheter Plan:Patient called/No answer/Message left to call [facility] pain service for any questions or complaints      Phone number disconnected, no other numbers available to contact pt.

## 2018-09-07 NOTE — PROGRESS NOTES
GORAN Peters    Nerve Cath Post Op Call    Patient Name: Emir Tian  :  1950  MRN:  [id number]  Date of Discharge: 2018    Nerve Cath Post Op Call:    Analgesia:Excellent  Pain Score:2/10  Side Effects:None  Patient Controlled ON Q pump infusion rate: 6ml/hr  Catheter Plan:Will continue with plan at home without changes

## 2018-09-08 NOTE — PROGRESS NOTES
GORAN Peters    Nerve Cath Post Op Call    Patient Name: Emir Tian  :  1950  MRN:  [id number]  Date of Discharge: 2018    Nerve Cath Post Op Call:    Analgesia:Good  Pain Score:2/10  Side Effects:None  Catheter Site:clean  Patient Controlled ON Q pump infusion rate: 6ml/hr  Catheter Plan:Will continue with plan at home without changes, Patient/Family member was instructed to remove the catheter during telephone contact and The patient was instructed to call ON CALL Anesthesia provider for any questions or problems

## 2019-07-02 ENCOUNTER — OFFICE VISIT (OUTPATIENT)
Dept: FAMILY MEDICINE CLINIC | Age: 69
End: 2019-07-02
Payer: MEDICARE

## 2019-07-02 VITALS
OXYGEN SATURATION: 91 % | DIASTOLIC BLOOD PRESSURE: 74 MMHG | SYSTOLIC BLOOD PRESSURE: 132 MMHG | HEIGHT: 70 IN | WEIGHT: 198.5 LBS | RESPIRATION RATE: 18 BRPM | BODY MASS INDEX: 28.42 KG/M2 | HEART RATE: 59 BPM

## 2019-07-02 DIAGNOSIS — M79.671 HEEL PAIN, BILATERAL: ICD-10-CM

## 2019-07-02 DIAGNOSIS — E78.49 OTHER HYPERLIPIDEMIA: ICD-10-CM

## 2019-07-02 DIAGNOSIS — Z23 NEED FOR PNEUMOCOCCAL VACCINE: ICD-10-CM

## 2019-07-02 DIAGNOSIS — M79.672 HEEL PAIN, BILATERAL: ICD-10-CM

## 2019-07-02 DIAGNOSIS — Z11.59 NEED FOR HEPATITIS C SCREENING TEST: ICD-10-CM

## 2019-07-02 DIAGNOSIS — Z13.1 SCREENING FOR DIABETES MELLITUS: ICD-10-CM

## 2019-07-02 DIAGNOSIS — I10 HYPERTENSION, UNSPECIFIED TYPE: Primary | ICD-10-CM

## 2019-07-02 PROCEDURE — 99203 OFFICE O/P NEW LOW 30 MIN: CPT | Performed by: NURSE PRACTITIONER

## 2019-07-02 PROCEDURE — G8419 CALC BMI OUT NRM PARAM NOF/U: HCPCS | Performed by: NURSE PRACTITIONER

## 2019-07-02 PROCEDURE — G8427 DOCREV CUR MEDS BY ELIG CLIN: HCPCS | Performed by: NURSE PRACTITIONER

## 2019-07-02 PROCEDURE — 1123F ACP DISCUSS/DSCN MKR DOCD: CPT | Performed by: NURSE PRACTITIONER

## 2019-07-02 PROCEDURE — 4040F PNEUMOC VAC/ADMIN/RCVD: CPT | Performed by: NURSE PRACTITIONER

## 2019-07-02 PROCEDURE — 1036F TOBACCO NON-USER: CPT | Performed by: NURSE PRACTITIONER

## 2019-07-02 PROCEDURE — 3017F COLORECTAL CA SCREEN DOC REV: CPT | Performed by: NURSE PRACTITIONER

## 2019-07-02 RX ORDER — ASPIRIN 81 MG/1
81 TABLET ORAL
COMMUNITY

## 2019-07-02 RX ORDER — LISINOPRIL 20 MG/1
TABLET ORAL
COMMUNITY
Start: 2019-06-07

## 2019-07-02 RX ORDER — AMLODIPINE BESYLATE 10 MG/1
TABLET ORAL
COMMUNITY
Start: 2019-06-07 | End: 2019-07-25 | Stop reason: SDUPTHER

## 2019-07-02 SDOH — HEALTH STABILITY: MENTAL HEALTH: HOW OFTEN DO YOU HAVE A DRINK CONTAINING ALCOHOL?: MONTHLY OR LESS

## 2019-07-02 SDOH — HEALTH STABILITY: MENTAL HEALTH: HOW MANY STANDARD DRINKS CONTAINING ALCOHOL DO YOU HAVE ON A TYPICAL DAY?: 1 OR 2

## 2019-07-02 ASSESSMENT — ENCOUNTER SYMPTOMS
RESPIRATORY NEGATIVE: 1
ALLERGIC/IMMUNOLOGIC NEGATIVE: 1
GASTROINTESTINAL NEGATIVE: 1
EYES NEGATIVE: 1

## 2019-07-25 RX ORDER — AMLODIPINE BESYLATE 10 MG/1
10 TABLET ORAL DAILY
Qty: 90 TABLET | Refills: 1 | Status: SHIPPED | OUTPATIENT
Start: 2019-07-25

## 2019-10-30 ENCOUNTER — CONSULT (OUTPATIENT)
Dept: CARDIOLOGY | Facility: CLINIC | Age: 69
End: 2019-10-30

## 2019-10-30 VITALS
HEART RATE: 55 BPM | OXYGEN SATURATION: 97 % | BODY MASS INDEX: 30.96 KG/M2 | SYSTOLIC BLOOD PRESSURE: 140 MMHG | WEIGHT: 209 LBS | HEIGHT: 69 IN | DIASTOLIC BLOOD PRESSURE: 64 MMHG

## 2019-10-30 DIAGNOSIS — I44.7 LBBB (LEFT BUNDLE BRANCH BLOCK): ICD-10-CM

## 2019-10-30 DIAGNOSIS — J43.2 CENTRILOBULAR EMPHYSEMA (HCC): ICD-10-CM

## 2019-10-30 DIAGNOSIS — R01.1 MURMUR, CARDIAC: ICD-10-CM

## 2019-10-30 DIAGNOSIS — I10 ESSENTIAL HYPERTENSION: Primary | ICD-10-CM

## 2019-10-30 DIAGNOSIS — I25.10 CORONARY ARTERY DISEASE INVOLVING NATIVE CORONARY ARTERY OF NATIVE HEART WITHOUT ANGINA PECTORIS: ICD-10-CM

## 2019-10-30 DIAGNOSIS — E78.2 MIXED HYPERLIPIDEMIA: ICD-10-CM

## 2019-10-30 DIAGNOSIS — R94.31 ABNORMAL ECG: ICD-10-CM

## 2019-10-30 PROBLEM — J44.9 COPD (CHRONIC OBSTRUCTIVE PULMONARY DISEASE): Status: ACTIVE | Noted: 2019-10-30

## 2019-10-30 PROCEDURE — 93000 ELECTROCARDIOGRAM COMPLETE: CPT | Performed by: INTERNAL MEDICINE

## 2019-10-30 PROCEDURE — 99204 OFFICE O/P NEW MOD 45 MIN: CPT | Performed by: INTERNAL MEDICINE

## 2019-10-30 RX ORDER — LISINOPRIL 20 MG/1
TABLET ORAL
COMMUNITY
Start: 2019-08-06 | End: 2020-11-02 | Stop reason: SDUPTHER

## 2019-10-30 NOTE — PROGRESS NOTES
"    Subjective:     Encounter Date:10/30/2019      Patient ID: Emir Tian is a 69 y.o. male.    Chief Complaint: Coronary artery disease  HPI  This is a 69-year-old male patient with known coronary artery disease apparently had a myocardial infarction in 2012 while living in [facility].  Patient indicates that cardiac catheterization performed at that time demonstrated a severe \"blockage\" and the patient had 2 coronary stents placed.  Details are unknown.  The patient indicates that in 2017 prior to moving to [place] his cardiologist in [place] performed a \"regional\" coronary angiogram which showed no evidence of in-stent restenosis or additional coronary artery disease.  He has not required any further coronary revascularization since 2012.  The patient has intermittent atypical noncardiac chest pain localized to a discrete area in his central sternum which is described as sharp and stabbing in quality with no pleuritic or exertional component.  The discomfort does not radiate and is fleeting in duration.  He denies shortness of breath at rest or with activity.  There is no orthopnea PND or lower extremity edema.  He has no dizziness palpitations or syncope.  He is a reformed smoker.  He has a history of hypertension and hypercholesterolemia both of which are treated appropriately.  He has no personal history of diabetes.  The following portions of the patient's history were reviewed and updated as appropriate: allergies, current medications, past family history, past medical history, past social history, past surgical history and problem  Review of Systems   Constitution: Negative for chills, diaphoresis, fever, weakness, malaise/fatigue, weight gain and weight loss.   HENT: Negative for ear discharge, hearing loss, hoarse voice and nosebleeds.    Eyes: Negative for discharge, double vision, pain and photophobia.   Cardiovascular: Negative for chest pain, claudication, cyanosis, dyspnea on " exertion, irregular heartbeat, leg swelling, near-syncope, orthopnea, palpitations, paroxysmal nocturnal dyspnea and syncope.   Respiratory: Negative for cough, hemoptysis, sputum production and wheezing.    Endocrine: Negative for cold intolerance, heat intolerance, polydipsia, polyphagia and polyuria.   Hematologic/Lymphatic: Negative for adenopathy and bleeding problem. Does not bruise/bleed easily.   Skin: Negative for color change, flushing, itching and rash.   Musculoskeletal: Negative for muscle cramps, muscle weakness, myalgias and stiffness.   Gastrointestinal: Negative for abdominal pain, diarrhea, hematemesis, hematochezia, nausea and vomiting.   Genitourinary: Negative for dysuria, frequency and nocturia.   Neurological: Negative for focal weakness, loss of balance, numbness, paresthesias and seizures.   Psychiatric/Behavioral: Negative for altered mental status, hallucinations and suicidal ideas.   Allergic/Immunologic: Negative for HIV exposure, hives and persistent infections.           Current Outpatient Medications:   •  amLODIPine (NORVASC) 10 MG tablet, Take 10 mg by mouth Daily., Disp: , Rfl:   •  amLODIPine-benazepril (LOTREL) 10-20 MG per capsule, Take 1 capsule by mouth Daily., Disp: , Rfl:   •  aspirin 81 MG EC tablet, Take 81 mg by mouth Daily., Disp: , Rfl:   •  atorvastatin (LIPITOR) 20 MG tablet, Take 20 mg by mouth Daily., Disp: , Rfl:   •  ferrous sulfate 325 (65 FE) MG tablet, Take 325 mg by mouth 2 (Two) Times a Day., Disp: , Rfl:   •  lisinopril (PRINIVIL,ZESTRIL) 20 MG tablet, , Disp: , Rfl:   •  mupirocin (BACTROBAN) 2 % ointment, Apply 1 application topically to the appropriate area as directed 2 (Two) Times a Day., Disp: , Rfl:   •  oxyCODONE-acetaminophen (PERCOCET) 5-325 MG per tablet, Take 1-2 tablets by mouth Every 4 (Four) Hours As Needed for pain, Disp: 50 tablet, Rfl: 0  •  PSYLLIUM FIBER PO, Take 2 tablets by mouth 2 (Two) Times a Day., Disp: , Rfl:     Objective:  "  Physical Exam   Constitutional: He is oriented to person, place, and time. He appears well-developed and well-nourished.   HENT:   Head: Normocephalic and atraumatic.   Mouth/Throat: Oropharynx is clear and moist.   Eyes: Conjunctivae and EOM are normal. Pupils are equal, round, and reactive to light. No scleral icterus.   Neck: Normal range of motion. Neck supple. No JVD present. No tracheal deviation present. No thyromegaly present.   Cardiovascular: Normal rate, regular rhythm, S1 normal, S2 normal, intact distal pulses and normal pulses. PMI is not displaced. Exam reveals no gallop and no friction rub.   Murmur heard.  High-pitched blowing plateau holosystolic murmur is present with a grade of 2/6 at the apex.  Pulmonary/Chest: Effort normal and breath sounds normal. No respiratory distress. He has no wheezes. He has no rales.   Abdominal: Soft. Bowel sounds are normal. He exhibits no distension and no mass. There is no tenderness. There is no rebound and no guarding.   Musculoskeletal: Normal range of motion. He exhibits no edema or deformity.   Neurological: He is alert and oriented to person, place, and time. He displays normal reflexes. No cranial nerve deficit. Coordination normal.   Skin: Skin is warm and dry. No rash noted. No erythema.   Psychiatric: He has a normal mood and affect. His behavior is normal. Thought content normal.     Blood pressure 140/64, pulse 55, height 175.3 cm (69\"), weight 94.8 kg (209 lb), SpO2 97 %.   Lab Review:     Assessment:       1. Essential hypertension  Less than ideal blood pressure control.  However, the patient indicates that he has not taken his blood pressure medication this morning.    2. Abnormal ECG  The patient is noted to have a chronic left bundle branch block on the twelve-lead electrocardiogram.  He has significant sinus bradycardia which is asymptomatic but is not on any medication which would result in slow heart rates.    3. LBBB (left bundle branch " block)  This is a chronic finding that they expect to 2012.  - ECG 12 Lead    4. Coronary artery disease involving native coronary artery of native heart without angina pectoris  Stable and angina free.    5. Mixed hyperlipidemia  The patient is on appropriate statin based cholesterol-lowering therapy.  This is followed by his primary care provider.  Our goal is to maintain LDL cholesterol less than 70 mg/dL.    6. Centrilobular emphysema (CMS/HCC)  Fortunately the patient no longer smokes.    7. Murmur, cardiac  The patient indicates that he has had an echocardiogram in [place].  We are trying to arrange for copies of his records to be made available.      ECG 12 Lead  Date/Time: 10/30/2019 9:48 AM  Performed by: Vernon Almaraz MD  Authorized by: Vernon Almaraz MD   Previous ECG: no previous ECG available  Rhythm: sinus bradycardia  Rate: bradycardic  BPM: 50  Conduction: left bundle branch block  QRS axis: left    Clinical impression: abnormal EKG            Plan:     No changes in his medication therapy have been made.  The patient is encouraged to maintain an active lifestyle.  He has been congratulated on his smoking cessation and cautioned to never again resume cigarette smoking.  We are trying to secure copies of his records from [facility] from his initial stent procedure in 2012 as well as his repeat cardiac catheterization and echocardiogram in 2017.

## 2020-04-22 ENCOUNTER — TELEMEDICINE (OUTPATIENT)
Dept: CARDIOLOGY | Facility: CLINIC | Age: 70
End: 2020-04-22

## 2020-04-22 DIAGNOSIS — E78.2 MIXED HYPERLIPIDEMIA: ICD-10-CM

## 2020-04-22 DIAGNOSIS — I25.10 CORONARY ARTERY DISEASE INVOLVING NATIVE CORONARY ARTERY OF NATIVE HEART WITHOUT ANGINA PECTORIS: ICD-10-CM

## 2020-04-22 DIAGNOSIS — I44.7 LBBB (LEFT BUNDLE BRANCH BLOCK): ICD-10-CM

## 2020-04-22 DIAGNOSIS — I10 ESSENTIAL HYPERTENSION: Primary | ICD-10-CM

## 2020-04-22 PROCEDURE — 99213 OFFICE O/P EST LOW 20 MIN: CPT | Performed by: INTERNAL MEDICINE

## 2020-04-22 RX ORDER — CHLORTHALIDONE 25 MG/1
12.5 TABLET ORAL DAILY
Qty: 15 TABLET | Refills: 11 | Status: SHIPPED | OUTPATIENT
Start: 2020-04-22 | End: 2020-04-24 | Stop reason: SDUPTHER

## 2020-04-22 NOTE — PROGRESS NOTES
Subjective:     Encounter Date:04/22/2020      Patient ID: Emir Tian is a 69 y.o. male.    Chief Complaint: Hypertension  HPI  This is a 69-year-old male patient with known coronary artery disease and hypertension who presents for a video conference visit during the coronavirus pandemic for routine follow-up.  The patient indicates that his blood pressure has been higher than usual.  He indicates that typically his systolic blood pressures was running around 120 mmHg.  During the coronavirus pandemic he is noticed his blood pressure has been around 140 mmHg systolic.  The patient indicates that he has gained a considerable amount of weight during the self quarantine phase of the pandemic.  He is not exercising regularly.  He has noticed some increased swelling in his feet and ankles.  He reports being fairly judicious about sodium intake but does use salt while cooking.  The patient has no chest discomfort at rest or with activity.  There is no exertional chest arm neck jaw shoulder or back discomfort.  There is no orthopnea or PND .  There is no dizziness palpitations or syncope.  The following portions of the patient's history were reviewed and updated as appropriate: allergies, current medications, past family history, past medical history, past social history, past surgical history and problem  Review of Systems   Constitution: Negative for chills, diaphoresis, fever, malaise/fatigue, weight gain and weight loss.   HENT: Negative for ear discharge, hearing loss, hoarse voice and nosebleeds.    Eyes: Negative for discharge, double vision, pain and photophobia.   Cardiovascular: Positive for leg swelling. Negative for chest pain, claudication, cyanosis, dyspnea on exertion, irregular heartbeat, near-syncope, orthopnea, palpitations, paroxysmal nocturnal dyspnea and syncope.   Respiratory: Negative for cough, hemoptysis, shortness of breath, sputum production and wheezing.    Endocrine: Negative for  cold intolerance, heat intolerance, polydipsia, polyphagia and polyuria.   Hematologic/Lymphatic: Negative for adenopathy and bleeding problem. Does not bruise/bleed easily.   Skin: Negative for color change, flushing, itching and rash.   Musculoskeletal: Negative for muscle cramps, muscle weakness, myalgias and stiffness.   Gastrointestinal: Negative for abdominal pain, diarrhea, hematemesis, hematochezia, nausea and vomiting.   Genitourinary: Negative for dysuria, frequency and nocturia.   Neurological: Negative for focal weakness, loss of balance, numbness, paresthesias and seizures.   Psychiatric/Behavioral: Negative for altered mental status, hallucinations and suicidal ideas.   Allergic/Immunologic: Negative for HIV exposure, hives and persistent infections.           Current Outpatient Medications:   •  amLODIPine (NORVASC) 10 MG tablet, Take 10 mg by mouth Daily., Disp: , Rfl:   •  amLODIPine-benazepril (LOTREL) 10-20 MG per capsule, Take 1 capsule by mouth Daily., Disp: , Rfl:   •  aspirin 81 MG EC tablet, Take 81 mg by mouth Daily., Disp: , Rfl:   •  atorvastatin (LIPITOR) 20 MG tablet, Take 20 mg by mouth Daily., Disp: , Rfl:   •  chlorthalidone (HYGROTON) 25 MG tablet, Take 0.5 tablets by mouth Daily., Disp: 15 tablet, Rfl: 11  •  ferrous sulfate 325 (65 FE) MG tablet, Take 325 mg by mouth 2 (Two) Times a Day., Disp: , Rfl:   •  lisinopril (PRINIVIL,ZESTRIL) 20 MG tablet, , Disp: , Rfl:   •  mupirocin (BACTROBAN) 2 % ointment, Apply 1 application topically to the appropriate area as directed 2 (Two) Times a Day., Disp: , Rfl:   •  oxyCODONE-acetaminophen (PERCOCET) 5-325 MG per tablet, Take 1-2 tablets by mouth Every 4 (Four) Hours As Needed for pain, Disp: 50 tablet, Rfl: 0  •  PSYLLIUM FIBER PO, Take 2 tablets by mouth 2 (Two) Times a Day., Disp: , Rfl:     Objective:   Physical Exam  There were no vitals taken for this visit, as this was a video conference visit.  The patient indicates that his home  blood pressures have been averaging around 140 mmHg systolic.  Not applicable.  This was a video visit.  Lab Review:     Assessment:       1. Essential hypertension  Poor blood pressure control.  The patient has been compliant with his medications.  He appears to be doing a reasonably good job at sodium restriction.    2. LBBB (left bundle branch block)  Chronic finding.    3. Coronary artery disease involving native coronary artery of native heart without angina pectoris  Stable and angina free.    4. Mixed hyperlipidemia  On appropriate statin based cholesterol-lowering therapy.  Goal LDL cholesterol less than 70 mg/dL.  This is followed by his primary care provider.    Procedures    Plan:     I have recommended increased emphasis on sodium restriction.  I have recommended starting chlorthalidone 12.5 mg orally once in the morning.  The patient is encouraged to increase his physical activity particularly as the coronavirus pandemic begins to wane.  No additional cardiovascular testing is indicated at this time.  The patient has been educated that diuretic therapy works best in the context of fluid and sodium restriction.  I have spent a considerable amount of time discussing with the patient foods and beverages that are high in sodium content with instructions to avoid these.  The patient has also been counseled to eat foods that are rich in potassium such as tomatoes, cantaloupe's and or bananas.    This was a video visit via ZOOM with the patient's permission.  The length of visit was 24 minutes.

## 2020-04-24 DIAGNOSIS — I10 ESSENTIAL HYPERTENSION: Primary | ICD-10-CM

## 2020-04-24 DIAGNOSIS — R60.0 EDEMA LEG: ICD-10-CM

## 2020-04-24 RX ORDER — CHLORTHALIDONE 25 MG/1
12.5 TABLET ORAL DAILY
Qty: 45 TABLET | Refills: 3 | Status: SHIPPED | OUTPATIENT
Start: 2020-04-24 | End: 2021-03-02

## 2020-11-02 RX ORDER — ATORVASTATIN CALCIUM 20 MG/1
20 TABLET, FILM COATED ORAL DAILY
Qty: 90 TABLET | Refills: 3 | Status: SHIPPED | OUTPATIENT
Start: 2020-11-02 | End: 2021-08-11

## 2020-11-02 RX ORDER — AMLODIPINE BESYLATE 5 MG/1
5 TABLET ORAL DAILY
Qty: 90 TABLET | Refills: 3 | Status: SHIPPED | OUTPATIENT
Start: 2020-11-02 | End: 2021-08-11

## 2020-11-02 RX ORDER — LISINOPRIL 20 MG/1
20 TABLET ORAL DAILY
Qty: 90 TABLET | Refills: 3 | Status: SHIPPED | OUTPATIENT
Start: 2020-11-02 | End: 2021-11-24

## 2020-11-02 NOTE — TELEPHONE ENCOUNTER
Pt called stating that he has tried to contact his PCP to get refills and called to ask if he was able to get refills through us. I told Pt that we could only refill cardiac medications and he would have to get the others through his PCP. Pt states that his amlodipine was changed to 5 mg rather than 10 mg.

## 2020-12-14 ENCOUNTER — OFFICE VISIT (OUTPATIENT)
Dept: CARDIOLOGY | Facility: CLINIC | Age: 70
End: 2020-12-14

## 2020-12-14 VITALS
WEIGHT: 222 LBS | DIASTOLIC BLOOD PRESSURE: 58 MMHG | OXYGEN SATURATION: 96 % | HEART RATE: 65 BPM | HEIGHT: 69 IN | BODY MASS INDEX: 32.88 KG/M2 | SYSTOLIC BLOOD PRESSURE: 120 MMHG

## 2020-12-14 DIAGNOSIS — I44.7 LBBB (LEFT BUNDLE BRANCH BLOCK): ICD-10-CM

## 2020-12-14 DIAGNOSIS — Z86.79 H/O: RHEUMATIC FEVER: ICD-10-CM

## 2020-12-14 DIAGNOSIS — I35.0 AORTIC STENOSIS, MODERATE: ICD-10-CM

## 2020-12-14 DIAGNOSIS — Z72.0 TOBACCO ABUSE: ICD-10-CM

## 2020-12-14 DIAGNOSIS — I25.10 CORONARY ARTERY DISEASE INVOLVING NATIVE CORONARY ARTERY OF NATIVE HEART WITHOUT ANGINA PECTORIS: Primary | ICD-10-CM

## 2020-12-14 DIAGNOSIS — I10 ESSENTIAL HYPERTENSION: ICD-10-CM

## 2020-12-14 DIAGNOSIS — J43.2 CENTRILOBULAR EMPHYSEMA (HCC): ICD-10-CM

## 2020-12-14 PROCEDURE — 99214 OFFICE O/P EST MOD 30 MIN: CPT | Performed by: INTERNAL MEDICINE

## 2020-12-14 NOTE — PROGRESS NOTES
Subjective:     Encounter Date:12/14/2020      Patient ID: Emir Tian is a 70 y.o. male.    Chief Complaint: Aortic stenosis  HPI  This is a 70-year-old male patient with longstanding known valvular aortic stenosis with a heart murmur dating back to his teenage years.  The patient reports having rheumatic fever as a child.  He previously underwent invasive evaluation of aortic stenosis severity in [facility] indicating mild-moderate severity valvular aortic stenosis.  Medical therapy was recommended.  The patient also underwent coronary angiography showing angiographically minor coronary atherosclerosis.  The patient indicates he has done very well since his last visit.  He has remained active with no exertional symptoms or limitations.  He reports compliance with his medications with no perceived side effects.  The patient has no chest discomfort at rest or with activity.  There is no exertional chest arm neck jaw shoulder or back discomfort.  There is no orthopnea PND or lower extremity edema.  There is no dizziness palpitations or syncope.  The following portions of the patient's history were reviewed and updated as appropriate: allergies, current medications, past family history, past medical history, past social history, past surgical history and problem  Review of Systems   Constitution: Negative for chills, diaphoresis, fever, malaise/fatigue, weight gain and weight loss.   HENT: Negative for ear discharge, hearing loss, hoarse voice and nosebleeds.    Eyes: Negative for discharge, double vision, pain and photophobia.   Cardiovascular: Negative for chest pain, claudication, cyanosis, dyspnea on exertion, irregular heartbeat, leg swelling, near-syncope, orthopnea, palpitations, paroxysmal nocturnal dyspnea and syncope.   Respiratory: Negative for cough, hemoptysis, shortness of breath, sputum production and wheezing.    Endocrine: Negative for cold intolerance, heat intolerance,  "polydipsia, polyphagia and polyuria.   Hematologic/Lymphatic: Negative for adenopathy and bleeding problem. Does not bruise/bleed easily.   Skin: Negative for color change, flushing, itching and rash.   Musculoskeletal: Negative for muscle cramps, muscle weakness, myalgias and stiffness.   Gastrointestinal: Negative for abdominal pain, diarrhea, hematemesis, hematochezia, nausea and vomiting.   Genitourinary: Negative for dysuria, frequency and nocturia.   Neurological: Negative for focal weakness, loss of balance, numbness, paresthesias and seizures.   Psychiatric/Behavioral: Negative for altered mental status, hallucinations and suicidal ideas.   Allergic/Immunologic: Negative for HIV exposure, hives and persistent infections.           Current Outpatient Medications:   •  amLODIPine (NORVASC) 5 MG tablet, Take 1 tablet by mouth Daily., Disp: 90 tablet, Rfl: 3  •  aspirin 81 MG EC tablet, Take 81 mg by mouth Daily., Disp: , Rfl:   •  atorvastatin (LIPITOR) 20 MG tablet, Take 1 tablet by mouth Daily., Disp: 90 tablet, Rfl: 3  •  chlorthalidone (HYGROTON) 25 MG tablet, Take 0.5 tablets by mouth Daily., Disp: 45 tablet, Rfl: 3  •  lisinopril (PRINIVIL,ZESTRIL) 20 MG tablet, Take 1 tablet by mouth Daily., Disp: 90 tablet, Rfl: 3  •  PSYLLIUM FIBER PO, Take 2 tablets by mouth 2 (Two) Times a Day., Disp: , Rfl:     Objective:   Vitals signs and nursing note reviewed.   Cardiovascular:      PMI at left midclavicular line. Normal rate. Regular rhythm. Normal S1. Normal S2.      Murmurs: There is a grade 3/6 high frequency musical crescendo-decrescendo midsystolic murmur at the apex.      No gallop. No click. No rub.      Comments: Gallavardin phenomenon  Pulses:     Intact distal pulses.   Edema:     Peripheral edema absent.       Blood pressure 120/58, pulse 65, height 175.3 cm (69\"), weight 101 kg (222 lb), SpO2 96 %.   Lab Review:     Assessment:       1. Coronary artery disease involving native coronary artery of " native heart without angina pectoris  Minor nonobstructive coronary atherosclerosis by invasive coronary angiography.  Angina very with active lifestyle.    2. Essential hypertension  Acceptable blood pressure control.    3. LBBB (left bundle branch block)  Chronic finding.    4. Centrilobular emphysema (CMS/HCC)  Typical tobacco related lung disease.  Stable symptoms.    5. Aortic stenosis, moderate  Asymptomatic.    6. Tobacco abuse  Ongoing daily tobacco abuse.    7. H/O: rheumatic fever  Presumed etiology to the patient's valvular aortic stenosis.    Procedures    Plan:     Advance Care Planning   ACP discussion was held with the patient during this visit. Patient has an advance directive (not in EMR), copy requested.  No changes to his medication therapy have been made at today's visit.  No additional cardiovascular testing is warranted.  The patient does not meet criteria for bacterial endocarditis prophylaxis precautions for dental procedures.  He is encouraged to maintain an active lifestyle.  The patient has been counseled regarding the essential need to discontinue cigarette smoking.

## 2021-03-02 DIAGNOSIS — I10 ESSENTIAL HYPERTENSION: ICD-10-CM

## 2021-03-02 DIAGNOSIS — R60.0 EDEMA LEG: ICD-10-CM

## 2021-03-02 RX ORDER — CHLORTHALIDONE 25 MG/1
TABLET ORAL
Qty: 45 TABLET | Refills: 3 | Status: SHIPPED | OUTPATIENT
Start: 2021-03-02 | End: 2021-11-24

## 2021-03-02 NOTE — TELEPHONE ENCOUNTER
Creatinine is 1.17, GFR is 63.  These labs were collected on 8/18/2020.  Refill for chlorthalidone sent as requested.

## 2021-08-11 RX ORDER — AMLODIPINE BESYLATE 5 MG/1
TABLET ORAL
Qty: 90 TABLET | Refills: 3 | Status: SHIPPED | OUTPATIENT
Start: 2021-08-11 | End: 2022-07-18

## 2021-08-11 RX ORDER — ATORVASTATIN CALCIUM 20 MG/1
TABLET, FILM COATED ORAL
Qty: 90 TABLET | Refills: 3 | Status: SHIPPED | OUTPATIENT
Start: 2021-08-11 | End: 2021-12-22

## 2021-11-24 DIAGNOSIS — I10 ESSENTIAL HYPERTENSION: ICD-10-CM

## 2021-11-24 DIAGNOSIS — R60.0 EDEMA LEG: ICD-10-CM

## 2021-11-24 RX ORDER — LISINOPRIL 20 MG/1
TABLET ORAL
Qty: 90 TABLET | Refills: 3 | Status: SHIPPED | OUTPATIENT
Start: 2021-11-24 | End: 2022-07-18

## 2021-11-24 RX ORDER — CHLORTHALIDONE 25 MG/1
TABLET ORAL
Qty: 45 TABLET | Refills: 3 | Status: SHIPPED | OUTPATIENT
Start: 2021-11-24 | End: 2021-12-16 | Stop reason: ALTCHOICE

## 2021-12-16 ENCOUNTER — OFFICE VISIT (OUTPATIENT)
Dept: CARDIOLOGY | Facility: CLINIC | Age: 71
End: 2021-12-16

## 2021-12-16 VITALS
DIASTOLIC BLOOD PRESSURE: 70 MMHG | HEIGHT: 70 IN | BODY MASS INDEX: 29.06 KG/M2 | HEART RATE: 70 BPM | RESPIRATION RATE: 18 BRPM | SYSTOLIC BLOOD PRESSURE: 128 MMHG | WEIGHT: 203 LBS | OXYGEN SATURATION: 96 %

## 2021-12-16 DIAGNOSIS — I25.10 CORONARY ARTERY DISEASE INVOLVING NATIVE CORONARY ARTERY OF NATIVE HEART WITHOUT ANGINA PECTORIS: Primary | ICD-10-CM

## 2021-12-16 DIAGNOSIS — Z86.79 H/O: RHEUMATIC FEVER: ICD-10-CM

## 2021-12-16 DIAGNOSIS — E78.2 MIXED HYPERLIPIDEMIA: ICD-10-CM

## 2021-12-16 DIAGNOSIS — I35.0 AORTIC STENOSIS, MODERATE: ICD-10-CM

## 2021-12-16 PROCEDURE — 99213 OFFICE O/P EST LOW 20 MIN: CPT | Performed by: NURSE PRACTITIONER

## 2021-12-16 RX ORDER — CHLORTHALIDONE 25 MG/1
25 TABLET ORAL WEEKLY
COMMUNITY
End: 2022-12-07

## 2021-12-16 NOTE — PROGRESS NOTES
[facility] Cardiology Office Follow Up Note    Emir Tian  [id number]  12/16/2021    Primary Care Provider: Rolly Neely MD   Referring Provider: No ref. provider found    Chief Complaint: Regular follow-up    History of Present Illness:   Mr. Emir Tian is a 71 y.o. male who presents to the Cardiology Clinic for regular follow-up.  Patient has a past medical history of longstanding known valvular aortic stenosis with heart murmur dating back to his teenage years.  The patient has previously reported having rheumatic fever as a child.  Other history includes coronary artery disease, hypertension, hyperlipidemia, chronic left bundle branch block, COPD, and remote tobacco use.  He presents today for regular follow-up.  Patient reports that he is feeling better than he fell last year at his cardiology clinic visit.  He states that he has intentionally lost some weight.  He also reports having a blood pressure at his PCP office with a SBP reading of 112 over 60s.  He specifically denies chest pain, dyspnea, palpitations, dizziness, syncope, lower extremity edema.  He does state that he is getting over a productive cough, but states he does have COPD.  He reports that he is scheduled next month to get a low-dose CT scan of his chest as part of his wellness exam.  He continues to take his prescribed medications without perceived side effects.  He offers no other complaints or concerns at this time.          Review of Systems:   Review of Systems   Constitutional: Negative for activity change, chills, diaphoresis, fatigue, fever and unexpected weight gain.   Eyes: Negative for blurred vision and visual disturbance.   Respiratory: Positive for cough. Negative for apnea, chest tightness, shortness of breath and wheezing.    Cardiovascular: Negative for chest pain, palpitations and leg swelling.   Gastrointestinal: Negative for abdominal distention, blood in stool, GERD and  "indigestion.   Endocrine: Negative for cold intolerance and heat intolerance.   Genitourinary: Negative for hematuria.   Musculoskeletal: Negative for gait problem, joint swelling and myalgias.   Skin: Negative for color change, pallor and bruise.   Neurological: Negative for dizziness, seizures, syncope, weakness, light-headedness, numbness, headache and confusion.   Hematological: Does not bruise/bleed easily.   Psychiatric/Behavioral: Negative for behavioral problems, sleep disturbance, suicidal ideas and depressed mood.     I have reviewed and confirmed the accuracy of the ROS as documented by the MA/LPN/RN BISHOP Ny    I have reviewed and/or updated the patient's past medical, past surgical, family, social history, problem list and allergies as appropriate.     Medications:     Current Outpatient Medications:   •  amLODIPine (NORVASC) 5 MG tablet, TAKE 1 TABLET EVERY DAY, Disp: 90 tablet, Rfl: 3  •  aspirin 81 MG EC tablet, Take 81 mg by mouth Daily., Disp: , Rfl:   •  atorvastatin (LIPITOR) 20 MG tablet, TAKE 1 TABLET EVERY DAY, Disp: 90 tablet, Rfl: 3  •  chlorthalidone (HYGROTON) 25 MG tablet, Take 25 mg by mouth 1 (One) Time Per Week. On Sunday due to affecting his kidney function., Disp: , Rfl:   •  lisinopril (PRINIVIL,ZESTRIL) 20 MG tablet, TAKE 1 TABLET EVERY DAY, Disp: 90 tablet, Rfl: 3  •  PSYLLIUM FIBER PO, Take 3 tablets by mouth Every Morning., Disp: , Rfl:     Physical Exam:  Vital Signs:   Vitals:    12/16/21 1019   BP: 128/70   BP Location: Right arm   Patient Position: Sitting   Cuff Size: Adult   Pulse: 70   Resp: 18   SpO2: 96%   Weight: 92.1 kg (203 lb)   Height: 176.5 cm (69.5\")     Body mass index is 29.55 kg/m².    Physical Exam  Vitals and nursing note reviewed.   Constitutional:       General: He is not in acute distress.     Appearance: Normal appearance. He is well-developed.   HENT:      Head: Normocephalic and atraumatic.   Eyes:      General: No scleral icterus.     " Extraocular Movements: Extraocular movements intact.   Neck:      Trachea: Trachea normal.   Cardiovascular:      Rate and Rhythm: Normal rate and regular rhythm.      Pulses: Normal pulses.      Heart sounds: Murmur heard.   No friction rub. No gallop.    Pulmonary:      Effort: Pulmonary effort is normal.      Breath sounds: Rhonchi present.      Comments: Loud loose cough, some rhonchi noted  Abdominal:      Palpations: Abdomen is soft.      Tenderness: There is no abdominal tenderness.   Musculoskeletal:         General: Normal range of motion.      Cervical back: Neck supple.      Right lower leg: No edema.      Left lower leg: No edema.   Skin:     General: Skin is warm and dry.      Findings: No bruising, lesion or rash.   Neurological:      Mental Status: He is alert and oriented to person, place, and time.      Motor: No weakness.      Gait: Gait normal.   Psychiatric:         Mood and Affect: Mood normal.         Behavior: Behavior normal. Behavior is cooperative.         Thought Content: Thought content does not include suicidal ideation.         Results Review:   I reviewed the patient's new clinical results.      Assessment / Plan:     1. Coronary artery disease involving native coronary artery of native heart without angina pectoris (Primary)  --Stable and angina free  --Continue low-dose aspirin and statin  --Follow-up in 1 year or sooner if needed    2. Mixed hyperlipidemia  --LDL goal less than 70  --Continue statin    3. Aortic stenosis, moderate  --Systolic murmur auscultated  --Asymptomatic    4. H/O: rheumatic fever  --Believed to have caused aortic stenosis      Preventative Cardiology:   Tobacco Cessation: N/A   Advance Care Planning: ACP discussion was declined by the patient. Patient does not have an advance directive, information provided. Patient does not have an advance directive, declines further assistance.     Follow Up:   Return in about 1 year (around 12/16/2022) for Follow-up with  Dr. Almaraz.      Thank you for allowing me to participate in the care of your patient. Please to not hesitate to contact me with additional questions or concerns.     BISHOP Cox

## 2021-12-22 ENCOUNTER — TELEPHONE (OUTPATIENT)
Dept: CARDIOLOGY | Facility: CLINIC | Age: 71
End: 2021-12-22

## 2021-12-22 DIAGNOSIS — I25.10 CORONARY ARTERY DISEASE INVOLVING NATIVE CORONARY ARTERY OF NATIVE HEART WITHOUT ANGINA PECTORIS: Primary | ICD-10-CM

## 2021-12-22 RX ORDER — ATORVASTATIN CALCIUM 40 MG/1
40 TABLET, FILM COATED ORAL DAILY
Qty: 90 TABLET | Refills: 3 | Status: SHIPPED | OUTPATIENT
Start: 2021-12-22 | End: 2022-03-11 | Stop reason: SDUPTHER

## 2021-12-22 NOTE — TELEPHONE ENCOUNTER
Please let the patient know I just reviewed his labs from 12/8/2021 from his PCP.  LDL is 100, but it should be < 70.  Please have him increase his atorvastatin to 40 mg nightly.  I have called in an updated script.    Thanks!

## 2022-03-11 DIAGNOSIS — I25.10 CORONARY ARTERY DISEASE INVOLVING NATIVE CORONARY ARTERY OF NATIVE HEART WITHOUT ANGINA PECTORIS: ICD-10-CM

## 2022-03-11 RX ORDER — ATORVASTATIN CALCIUM 40 MG/1
40 TABLET, FILM COATED ORAL DAILY
Qty: 90 TABLET | Refills: 3 | Status: SHIPPED | OUTPATIENT
Start: 2022-03-11 | End: 2022-11-02 | Stop reason: SDUPTHER

## 2022-07-18 RX ORDER — LISINOPRIL 20 MG/1
TABLET ORAL
Qty: 90 TABLET | Refills: 3 | Status: SHIPPED | OUTPATIENT
Start: 2022-07-18 | End: 2022-11-02 | Stop reason: SDUPTHER

## 2022-07-18 RX ORDER — AMLODIPINE BESYLATE 5 MG/1
TABLET ORAL
Qty: 90 TABLET | Refills: 3 | Status: SHIPPED | OUTPATIENT
Start: 2022-07-18 | End: 2022-11-02 | Stop reason: SDUPTHER

## 2022-07-26 ENCOUNTER — TELEPHONE (OUTPATIENT)
Dept: CARDIOLOGY | Facility: CLINIC | Age: 72
End: 2022-07-26
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
## 2022-07-27 ENCOUNTER — TELEPHONE (OUTPATIENT)
Dept: CARDIOLOGY | Facility: CLINIC | Age: 72
End: 2022-07-27
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
## 2022-07-27 NOTE — TELEPHONE ENCOUNTER
Pt advised to go to the ER due to the CP. Pt states that he cannot go to a ER due to the distance and was asking what he needs to do about the Fever. Pt was advised to be tested for covid and we cannot see him due to being within 5 days of symptoms starting. Pt was advised he can get a covid test done at urgent cares, or at home testing through insurance at most pharmacies.

## 2022-11-02 DIAGNOSIS — I25.10 CORONARY ARTERY DISEASE INVOLVING NATIVE CORONARY ARTERY OF NATIVE HEART WITHOUT ANGINA PECTORIS: ICD-10-CM

## 2022-11-02 RX ORDER — AMLODIPINE BESYLATE 5 MG/1
5 TABLET ORAL DAILY
Qty: 90 TABLET | Refills: 3 | Status: SHIPPED | OUTPATIENT
Start: 2022-11-02

## 2022-11-02 RX ORDER — LISINOPRIL 20 MG/1
20 TABLET ORAL DAILY
Qty: 90 TABLET | Refills: 3 | Status: SHIPPED | OUTPATIENT
Start: 2022-11-02

## 2022-11-02 RX ORDER — ATORVASTATIN CALCIUM 40 MG/1
40 TABLET, FILM COATED ORAL DAILY
Qty: 90 TABLET | Refills: 3 | Status: SHIPPED | OUTPATIENT
Start: 2022-11-02 | End: 2022-12-07

## 2022-11-14 ENCOUNTER — TELEPHONE (OUTPATIENT)
Dept: CARDIOLOGY | Facility: CLINIC | Age: 72
End: 2022-11-14

## 2022-11-14 NOTE — TELEPHONE ENCOUNTER
Caller: Emir Tian    Relationship: Self    Best call back number:[phone] OK TO LEAVE VM   When is it needed: ASAP    Additional information or concerns:PT SAYS HE IS CHANGING INSURANCES TO  [facility] , PT STATES THAT [facility]AN TOLD HIM THEY NEED ALL MEDICATION TO BE PRE AUTHORIZED SO HE CAN GET REFILLS ON HIS MEDICATION. PT SAID [facility] HAS TRIED TO REACH OUT TO OFFICE. PLEASE REACH OUT TO PT REGARDING THIS. THANK YOU.

## 2022-11-14 NOTE — TELEPHONE ENCOUNTER
Spoke with patient. States his insurance is going to change from [employer] to [place] in January 2023. Patient was just needing the medications to be sent in to the [place] mail order pharmacy. I advised for patient to contact the office when he is needing refills.

## 2022-12-07 DIAGNOSIS — I25.10 CORONARY ARTERY DISEASE INVOLVING NATIVE CORONARY ARTERY OF NATIVE HEART WITHOUT ANGINA PECTORIS: ICD-10-CM

## 2022-12-07 RX ORDER — ATORVASTATIN CALCIUM 40 MG/1
TABLET, FILM COATED ORAL
Qty: 90 TABLET | Refills: 0 | Status: SHIPPED | OUTPATIENT
Start: 2022-12-07

## 2022-12-07 RX ORDER — CHLORTHALIDONE 25 MG/1
TABLET ORAL
Qty: 45 TABLET | Refills: 0 | Status: SHIPPED | OUTPATIENT
Start: 2022-12-07 | End: 2023-03-30

## 2022-12-14 ENCOUNTER — OFFICE VISIT (OUTPATIENT)
Dept: CARDIOLOGY | Facility: CLINIC | Age: 72
End: 2022-12-14

## 2022-12-14 ENCOUNTER — HOSPITAL ENCOUNTER (OUTPATIENT)
Facility: HOSPITAL | Age: 72
Discharge: HOME | End: 2022-12-14
Payer: MEDICARE

## 2022-12-14 VITALS
RESPIRATION RATE: 16 BRPM | HEIGHT: 70 IN | BODY MASS INDEX: 28.49 KG/M2 | WEIGHT: 199 LBS | TEMPERATURE: 97 F | OXYGEN SATURATION: 96 % | HEART RATE: 60 BPM

## 2022-12-14 DIAGNOSIS — I44.7 LBBB (LEFT BUNDLE BRANCH BLOCK): ICD-10-CM

## 2022-12-14 DIAGNOSIS — G45.9 TIA (TRANSIENT ISCHEMIC ATTACK): Primary | ICD-10-CM

## 2022-12-14 DIAGNOSIS — I35.0 AORTIC STENOSIS, MODERATE: ICD-10-CM

## 2022-12-14 DIAGNOSIS — I25.10 CORONARY ARTERY DISEASE INVOLVING NATIVE CORONARY ARTERY OF NATIVE HEART WITHOUT ANGINA PECTORIS: ICD-10-CM

## 2022-12-14 DIAGNOSIS — I10 ESSENTIAL HYPERTENSION: ICD-10-CM

## 2022-12-14 PROCEDURE — 99214 OFFICE O/P EST MOD 30 MIN: CPT | Performed by: INTERNAL MEDICINE

## 2022-12-14 PROCEDURE — 93271 ECG/MONITORING AND ANALYSIS: CPT
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
## 2022-12-20 ENCOUNTER — HOSPITAL ENCOUNTER (OUTPATIENT)
Dept: CARDIOLOGY | Facility: HOSPITAL | Age: 72
Discharge: HOME | End: 2022-12-20
Admitting: INTERNAL MEDICINE

## 2022-12-20 VITALS — WEIGHT: 199.08 LBS | HEIGHT: 69 IN | BODY MASS INDEX: 29.49 KG/M2

## 2022-12-20 LAB
BH CV ECHO MEAS - AI P1/2T: 551.8 MSEC
BH CV ECHO MEAS - AO MAX PG: 59 MMHG
BH CV ECHO MEAS - AO MEAN PG: 28 MMHG
BH CV ECHO MEAS - AO ROOT DIAM: 3.4 CM
BH CV ECHO MEAS - AO V2 MAX: 384 CM/SEC
BH CV ECHO MEAS - AO V2 VTI: 77.7 CM
BH CV ECHO MEAS - AVA(I,D): 0.88 CM2
BH CV ECHO MEAS - EDV(CUBED): 122 ML
BH CV ECHO MEAS - EDV(MOD-SP2): 107 ML
BH CV ECHO MEAS - EDV(MOD-SP4): 117 ML
BH CV ECHO MEAS - EF(MOD-BP): 70.9 %
BH CV ECHO MEAS - EF(MOD-SP2): 64.4 %
BH CV ECHO MEAS - EF(MOD-SP4): 75.9 %
BH CV ECHO MEAS - ESV(CUBED): 21 ML
BH CV ECHO MEAS - ESV(MOD-SP2): 38.1 ML
BH CV ECHO MEAS - ESV(MOD-SP4): 28.2 ML
BH CV ECHO MEAS - FS: 44.4 %
BH CV ECHO MEAS - IVS/LVPW: 0.87 CM
BH CV ECHO MEAS - IVSD: 1.12 CM
BH CV ECHO MEAS - LA DIMENSION: 3.6 CM
BH CV ECHO MEAS - LAT PEAK E' VEL: 9.4 CM/SEC
BH CV ECHO MEAS - LV DIASTOLIC VOL/BSA (35-75): 56.2 CM2
BH CV ECHO MEAS - LV MASS(C)D: 232.1 GRAMS
BH CV ECHO MEAS - LV MAX PG: 6.7 MMHG
BH CV ECHO MEAS - LV MEAN PG: 3 MMHG
BH CV ECHO MEAS - LV SYSTOLIC VOL/BSA (12-30): 13.5 CM2
BH CV ECHO MEAS - LV V1 MAX: 129 CM/SEC
BH CV ECHO MEAS - LV V1 VTI: 22 CM
BH CV ECHO MEAS - LVIDD: 5 CM
BH CV ECHO MEAS - LVIDS: 2.8 CM
BH CV ECHO MEAS - LVOT AREA: 3.1 CM2
BH CV ECHO MEAS - LVOT DIAM: 1.99 CM
BH CV ECHO MEAS - LVPWD: 1.29 CM
BH CV ECHO MEAS - MED PEAK E' VEL: 7.1 CM/SEC
BH CV ECHO MEAS - MV A MAX VEL: 91.2 CM/SEC
BH CV ECHO MEAS - MV DEC TIME: 0.27 MSEC
BH CV ECHO MEAS - MV E MAX VEL: 58.2 CM/SEC
BH CV ECHO MEAS - MV E/A: 0.64
BH CV ECHO MEAS - MV MAX PG: 3.6 MMHG
BH CV ECHO MEAS - MV MEAN PG: 1 MMHG
BH CV ECHO MEAS - MV V2 VTI: 29 CM
BH CV ECHO MEAS - MVA(VTI): 2.36 CM2
BH CV ECHO MEAS - PA ACC TIME: 0.09 SEC
BH CV ECHO MEAS - PA PR(ACCEL): 37.6 MMHG
BH CV ECHO MEAS - PA V2 MAX: 148 CM/SEC
BH CV ECHO MEAS - RAP SYSTOLE: 3 MMHG
BH CV ECHO MEAS - SI(MOD-SP2): 33.1 ML/M2
BH CV ECHO MEAS - SI(MOD-SP4): 42.6 ML/M2
BH CV ECHO MEAS - SV(LVOT): 68.4 ML
BH CV ECHO MEAS - SV(MOD-SP2): 68.9 ML
BH CV ECHO MEAS - SV(MOD-SP4): 88.8 ML
BH CV ECHO MEAS - TAPSE (>1.6): 2.7 CM
BH CV ECHO MEASUREMENTS AVERAGE E/E' RATIO: 7.05
BH CV ECHO SHUNT ASSESSMENT PERFORMED (HIDDEN SCRIPTING): 1
BH CV XLRA - RV BASE: 3.5 CM
BH CV XLRA - TDI S': 14.7 CM/SEC
LEFT ATRIUM VOLUME INDEX: 31.8 ML/M2
LV EF 2D ECHO EST: 70 %
MAXIMAL PREDICTED HEART RATE: 149 BPM
STRESS TARGET HR: 127 BPM

## 2022-12-20 PROCEDURE — 93306 TTE W/DOPPLER COMPLETE: CPT | Performed by: INTERNAL MEDICINE

## 2022-12-20 PROCEDURE — 93306 TTE W/DOPPLER COMPLETE: CPT

## 2022-12-22 ENCOUNTER — TELEPHONE (OUTPATIENT)
Dept: CARDIOLOGY | Facility: CLINIC | Age: 72
End: 2022-12-22

## 2022-12-22 NOTE — TELEPHONE ENCOUNTER
Caller: Emir Tian    Relationship to patient: Self    Best call back number:[phone]     Patient is needing: PT IS WANTING TO KNOW IF IT IS OKAY FOR HIM TO KEEP HIS NEUROLOGY APPT SCHEDULED FOR 2.3.23. PLEASE ADVISE, THANK YOU.

## 2022-12-22 NOTE — TELEPHONE ENCOUNTER
LMOM FOR PATIENT.  DR. WARE DOES WANT PATIENT TO KEEP SCHEDULED APPT TO SEE NEUROLOGY.  PATIENT CAN CALL BACK IF ANY FURTHER QUESTIONS.    HUB TO SHARE.

## 2022-12-23 ENCOUNTER — TELEPHONE (OUTPATIENT)
Dept: CARDIOLOGY | Facility: CLINIC | Age: 72
End: 2022-12-23

## 2022-12-23 NOTE — TELEPHONE ENCOUNTER
"Patient saw Dr. Almaraz at the [facility] on 12/14/22. During this visit, Dr. Almaraz ordered an MCOT monitor. BELLE and a physician with [employer] called stating that due to this monitor being ordered for a TIA that happened >6 months ago, and needing more documentation, this monitor may not be covered by patient's insurance. Called our rep, Sage Randolph with [employer] States the monitor should be switched to \"event monitor\" by the company and the monitor should be covered. States he will check in to this for an estimated out of pocket cost for patient and will reach back out to let us know.  "
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
## 2022-12-30 ENCOUNTER — OFFICE VISIT (OUTPATIENT)
Dept: NEUROLOGY | Facility: CLINIC | Age: 72
End: 2022-12-30

## 2022-12-30 VITALS
SYSTOLIC BLOOD PRESSURE: 141 MMHG | OXYGEN SATURATION: 95 % | BODY MASS INDEX: 29.41 KG/M2 | WEIGHT: 198.6 LBS | HEIGHT: 69 IN | TEMPERATURE: 96.9 F | HEART RATE: 50 BPM | DIASTOLIC BLOOD PRESSURE: 59 MMHG

## 2022-12-30 DIAGNOSIS — Z86.73 PERSONAL HISTORY OF TIA (TRANSIENT ISCHEMIC ATTACK): Primary | ICD-10-CM

## 2022-12-30 DIAGNOSIS — Z87.891 FORMER SMOKER: ICD-10-CM

## 2022-12-30 DIAGNOSIS — E78.2 MIXED HYPERLIPIDEMIA: ICD-10-CM

## 2022-12-30 DIAGNOSIS — I10 PRIMARY HYPERTENSION: ICD-10-CM

## 2022-12-30 PROCEDURE — 99213 OFFICE O/P EST LOW 20 MIN: CPT | Performed by: NURSE PRACTITIONER

## 2023-01-17 ENCOUNTER — OUTSIDE FACILITY SERVICE (OUTPATIENT)
Dept: CARDIOLOGY | Facility: CLINIC | Age: 73
End: 2023-01-17
Payer: MEDICARE

## 2023-01-17 PROCEDURE — 93272 ECG/REVIEW INTERPRET ONLY: CPT | Performed by: INTERNAL MEDICINE

## 2023-01-30 ENCOUNTER — TELEPHONE (OUTPATIENT)
Dept: NEUROLOGY | Facility: CLINIC | Age: 73
End: 2023-01-30
Payer: MEDICARE

## 2023-01-30 NOTE — TELEPHONE ENCOUNTER
Caller: Emir Tian    Relationship: Self    Best call back number: [phone]    What is the best time to reach you: ANY    Who are you requesting to speak with (clinical staff, provider,  specific staff member): FLORINA    What was the call regarding: PATIENT TELEPHONED TO ADVISE HE CANCELED DIAGNOSTIC APPTS SCHEDULED FOR 2/1/23 AS THE COST OF THE VISIT IS $200 & HE CAN NOT AFFORD IT @ THIS TIME. HE WOULD LIKE A CALLBACK TO ADVISE IF HE NEEDS TO KEEP F/U APPT SCHED.   3/31/23 OR CAN HE CANCEL THE VISIT    Do you require a callback: YES PLEASE CALL & ADVISE

## 2023-01-31 NOTE — TELEPHONE ENCOUNTER
Relayed message from BISHOP Sanabria.  Patient verbalized understanding and had no further questions.

## 2023-02-08 ENCOUNTER — OFFICE VISIT (OUTPATIENT)
Dept: CARDIOLOGY | Facility: CLINIC | Age: 73
End: 2023-02-08
Payer: MEDICARE

## 2023-02-08 VITALS
OXYGEN SATURATION: 96 % | SYSTOLIC BLOOD PRESSURE: 130 MMHG | RESPIRATION RATE: 18 BRPM | HEART RATE: 68 BPM | WEIGHT: 188 LBS | BODY MASS INDEX: 26.92 KG/M2 | DIASTOLIC BLOOD PRESSURE: 68 MMHG | HEIGHT: 70 IN

## 2023-02-08 DIAGNOSIS — I44.7 LBBB (LEFT BUNDLE BRANCH BLOCK): ICD-10-CM

## 2023-02-08 DIAGNOSIS — I10 PRIMARY HYPERTENSION: ICD-10-CM

## 2023-02-08 DIAGNOSIS — I10 ESSENTIAL HYPERTENSION: ICD-10-CM

## 2023-02-08 DIAGNOSIS — Z72.0 TOBACCO ABUSE: ICD-10-CM

## 2023-02-08 DIAGNOSIS — I25.10 CORONARY ARTERY DISEASE INVOLVING NATIVE CORONARY ARTERY OF NATIVE HEART WITHOUT ANGINA PECTORIS: ICD-10-CM

## 2023-02-08 DIAGNOSIS — G45.9 TIA (TRANSIENT ISCHEMIC ATTACK): ICD-10-CM

## 2023-02-08 DIAGNOSIS — I35.0 AORTIC STENOSIS, MODERATE: Primary | ICD-10-CM

## 2023-02-08 DIAGNOSIS — E78.5 DYSLIPIDEMIA: ICD-10-CM

## 2023-02-08 PROCEDURE — 99213 OFFICE O/P EST LOW 20 MIN: CPT | Performed by: INTERNAL MEDICINE

## 2023-02-08 NOTE — PROGRESS NOTES
Subjective:     Encounter Date:02/08/2023      Patient ID: Emir Tian is a 72 y.o. male.    Chief Complaint: TIA  HPI  This is a 72-year-old male patient who presents to cardiology clinic for follow-up after outpatient testing.  The patient had an echocardiogram performed which showed moderate valvular aortic stenosis.  He was demonstrated to have mild concentric left ventricular hypertrophy with normal cardiac chamber dimensions and a normal ejection fraction.  Left ventricular diastolic function was consistent with grade 1 but no evidence of elevated resting mean left atrial pressure.  The patient was ordered to have a 30-day cardiac monitor but chose not to have this done.  He has had no recurrent stroke or TIA-like symptoms.  He reports compliance with his medications.  Unfortunately he continues to smoke daily.  The following portions of the patient's history were reviewed and updated as appropriate: allergies, current medications, past family history, past medical history, past social history, past surgical history and problem  Review of Systems   Constitutional: Negative for chills, diaphoresis, fever, malaise/fatigue, weight gain and weight loss.   HENT: Negative for ear discharge, hearing loss, hoarse voice and nosebleeds.    Eyes: Negative for discharge, double vision, pain and photophobia.   Cardiovascular: Negative for chest pain, claudication, cyanosis, dyspnea on exertion, irregular heartbeat, leg swelling, near-syncope, orthopnea, palpitations, paroxysmal nocturnal dyspnea and syncope.   Respiratory: Negative for cough, hemoptysis, shortness of breath, sputum production and wheezing.    Endocrine: Negative for cold intolerance, heat intolerance, polydipsia, polyphagia and polyuria.   Hematologic/Lymphatic: Negative for adenopathy and bleeding problem. Does not bruise/bleed easily.   Skin: Negative for color change, flushing, itching and rash.   Musculoskeletal: Negative for muscle  cramps, muscle weakness, myalgias and stiffness.   Gastrointestinal: Negative for abdominal pain, diarrhea, hematemesis, hematochezia, nausea and vomiting.   Genitourinary: Negative for dysuria, frequency and nocturia.   Neurological: Negative for focal weakness, loss of balance, numbness, paresthesias and seizures.   Psychiatric/Behavioral: Negative for altered mental status, hallucinations and suicidal ideas.   Allergic/Immunologic: Negative for HIV exposure, hives and persistent infections.           Current Outpatient Medications:   •  amLODIPine (NORVASC) 5 MG tablet, Take 1 tablet by mouth Daily., Disp: 90 tablet, Rfl: 3  •  aspirin 81 MG EC tablet, Take 81 mg by mouth Daily., Disp: , Rfl:   •  atorvastatin (LIPITOR) 40 MG tablet, TAKE 1 TABLET EVERY DAY, Disp: 90 tablet, Rfl: 0  •  chlorthalidone (HYGROTON) 25 MG tablet, TAKE 1/2 TABLET EVERY DAY, Disp: 45 tablet, Rfl: 0  •  lisinopril (PRINIVIL,ZESTRIL) 20 MG tablet, Take 1 tablet by mouth Daily., Disp: 90 tablet, Rfl: 3  •  PSYLLIUM FIBER PO, Take 3 tablets by mouth Every Morning., Disp: , Rfl:     Objective:   Vitals and nursing note reviewed.   Constitutional:       Appearance: Healthy appearance. Not in distress.   Neck:      Vascular: No JVR. JVD normal.   Pulmonary:      Effort: Pulmonary effort is normal.      Breath sounds: Normal breath sounds. No wheezing. No rhonchi. No rales.   Chest:      Chest wall: Not tender to palpatation.   Cardiovascular:      PMI at left midclavicular line. Normal rate. Regular rhythm. Normal S1. Normal S2.      Murmurs: There is a grade 3/6 harsh crescendo-decrescendo midsystolic murmur at the URSB, radiating to the neck.      No gallop. No click. No rub.   Pulses:     Intact distal pulses.   Edema:     Peripheral edema absent.   Abdominal:      General: Bowel sounds are normal.      Palpations: Abdomen is soft.      Tenderness: There is no abdominal tenderness.   Musculoskeletal: Normal range of motion.         General:  "No tenderness. Skin:     General: Skin is warm and dry.   Neurological:      General: No focal deficit present.      Mental Status: Alert and oriented to person, place and time.       Blood pressure 130/68, pulse 68, resp. rate 18, height 177.8 cm (70\"), weight 85.3 kg (188 lb), SpO2 96 %.   Lab Review:     Assessment:       1. Aortic stenosis, moderate  Moderate severity by echo criteria with concordant murmur on physical examination.    2. Coronary artery disease involving native coronary artery of native heart without angina pectoris      3. Essential hypertension  Acceptable blood pressure control.    4. LBBB (left bundle branch block)  Chronic finding.    5. Tobacco abuse  Ongoing daily cigarette use.    6. TIA (transient ischemic attack)  No evidence of recurrence.    7. Dyslipidemia  Tolerating statin based cholesterol-lowering therapy without side effect.        Procedures    Plan:     Advance Care Planning   ACP discussion was held with the patient during this visit. Patient has an advance directive (not in EMR), copy requested.       The epic based detection algorithm failed to identify that the patient continues to smoke.  This is particularly concerning as this is now a \"tracked metric\", for quality measures.  The patient has been counseled regarding the essential need to discontinue cigarette smoking.    Bacterial endocarditis prophylaxis precautions not indicated under current guideline recommendations.    No changes in medications have been made at today's visit.    Recommend repeat echocardiogram for change in symptoms and/or physical examination.      "

## 2023-03-30 ENCOUNTER — OFFICE VISIT (OUTPATIENT)
Dept: GASTROENTEROLOGY | Facility: CLINIC | Age: 73
End: 2023-03-30
Payer: MEDICARE

## 2023-03-30 VITALS
BODY MASS INDEX: 26.69 KG/M2 | OXYGEN SATURATION: 96 % | SYSTOLIC BLOOD PRESSURE: 118 MMHG | WEIGHT: 186 LBS | DIASTOLIC BLOOD PRESSURE: 68 MMHG | HEART RATE: 97 BPM

## 2023-03-30 DIAGNOSIS — R19.5 FECAL OCCULT BLOOD TEST POSITIVE: Primary | ICD-10-CM

## 2023-03-30 DIAGNOSIS — D69.6 THROMBOCYTOPENIA: ICD-10-CM

## 2023-03-30 DIAGNOSIS — Z12.11 ENCOUNTER FOR SCREENING FOR MALIGNANT NEOPLASM OF COLON: Primary | ICD-10-CM

## 2023-03-30 DIAGNOSIS — Z87.898 HISTORY OF ALCOHOL USE: ICD-10-CM

## 2023-03-30 RX ORDER — CHLORTHALIDONE 25 MG/1
25 TABLET ORAL
COMMUNITY
Start: 2020-12-11

## 2023-03-30 RX ORDER — POLYETHYLENE GLYCOL 3350, SODIUM SULFATE, SODIUM CHLORIDE, POTASSIUM CHLORIDE, ASCORBIC ACID, SODIUM ASCORBATE 140-9-5.2G
1 KIT ORAL ONCE
Qty: 1 EACH | Refills: 0 | COMMUNITY
Start: 2023-03-30 | End: 2023-03-30

## 2023-03-30 RX ORDER — SODIUM CHLORIDE 9 MG/ML
30 INJECTION, SOLUTION INTRAVENOUS CONTINUOUS PRN
OUTPATIENT
Start: 2023-03-30

## 2023-03-30 NOTE — PROGRESS NOTES
New Patient Consult      Date: 2023   Patient Name: Emir Tian  MRN: [id number]  : 1950     Primary Care Provider: Rolly Neely MD  Referring Provider: James    Chief Complaint   Patient presents with   • Colon Cancer Screening     History of Present Illness: Emir Tian is a 72 y.o. male who is here today as a consultation with Gastroenterology for evaluation of Colon Cancer Screening.    Has had blood in his stool on occult stool tests in the past. Admits known hemorrhoids. He does have mild and intermittent bright red blood in very small amounts on the tissue intermittently. He had EGD and colonoscopy in approx  in [place], had internal hemorrhoids at that time. Also has history of external hemorrhoid surgery numerous years ago. No history of anemia. No family history of colon cancer.     Does report a past history of heavy alcohol use, drank beer daily at least 12-30 cans, from age 21 to 61. Cut back on amount and drank some up until age 68. No alcohol use now. Has been told his platelets are low on previous labs. No known history of any liver disease.     No current reflux symptoms. No abdominal pain. No change in bowel habits, has regular daily stools. Takes ASA daily, has history of MI and cardiac stents numerous years ago.     Subjective      Past Medical History:   Diagnosis Date   • Arthritis    • Asthma     AS A CHILD   • COPD (chronic obstructive pulmonary disease) ([facility])    • Coronary artery disease    • Fractures    • Heart attack (HCC) 2012    2 STENTS   • Heart disease    • Heart murmur    • Hyperlipidemia    • Hypertension    • Migraine    • Wears dentures     FULL PLATE ON TOP, PARTIAL ON THE BOTTOM   • Wears glasses      Past Surgical History:   Procedure Laterality Date   • BONE GRAFT Right     around age 12-14.  RIGHT LEG (BELOW THE KNEE)   • CARDIAC CATHETERIZATION  ,    • COLONOSCOPY  2016    Fl- internal hemorrhoids   • CORONARY STENT  PLACEMENT  12/21/2012   • ENDOSCOPY     • ENDOSCOPY  2016    Fl- normal   • SHOULDER ARTHROSCOPY Right 09/05/2018    Procedure: SHOULDER ARTHROSCOPY RIGHT WITH SUBACROMIAL DECOMPRESSION, DISTAL CLAVICLE RESECTION, ROTATOR CUFF REPAIR AND BICEPS TENOTOMY;  Surgeon: Dharmesh Sales MD;  Location: [facility];  Service: Orthopedics   • TONSILLECTOMY       Family History   Problem Relation Age of Onset   • Heart attack Father    • Hyperlipidemia Father    • Hypertension Father      Social History     Socioeconomic History   • Marital status:    Tobacco Use   • Smoking status: Former     Packs/day: 0.50     Years: 42.00     Pack years: 21.00     Types: Cigarettes     Quit date: 12/20/2012     Years since quitting: 10.2     Passive exposure: Past   • Smokeless tobacco: Never   • Tobacco comments:     Off and on with 10 or more year breaks on two occations   Vaping Use   • Vaping Use: Never used   Substance and Sexual Activity   • Alcohol use: Not Currently     Comment: 6-8 a week   • Drug use: No   • Sexual activity: Defer       Current Outpatient Medications:   •  amLODIPine (NORVASC) 5 MG tablet, Take 1 tablet by mouth Daily., Disp: 90 tablet, Rfl: 3  •  aspirin 81 MG EC tablet, Take 1 tablet by mouth Daily., Disp: , Rfl:   •  atorvastatin (LIPITOR) 40 MG tablet, TAKE 1 TABLET EVERY DAY, Disp: 90 tablet, Rfl: 0  •  chlorthalidone (HYGROTON) 25 MG tablet, 1 tablet. One ever sunday, Disp: , Rfl:   •  lisinopril (PRINIVIL,ZESTRIL) 20 MG tablet, Take 1 tablet by mouth Daily., Disp: 90 tablet, Rfl: 3  •  PSYLLIUM FIBER PO, Take 3 tablets by mouth Every Morning., Disp: , Rfl:     Allergies   Allergen Reactions   • Iodine Solution [Povidone Iodine] Itching     The following portions of the patient's history were reviewed and updated as appropriate: allergies, current medications, past family history, past medical history, past social history, past surgical history and problem list.    Objective     Physical  Exam  Vitals reviewed.   Constitutional:       General: He is not in acute distress.     Appearance: Normal appearance. He is well-developed. He is not ill-appearing or diaphoretic.   HENT:      Head: Normocephalic and atraumatic.      Right Ear: External ear normal.      Left Ear: External ear normal.      Nose: Nose normal.   Eyes:      General: No scleral icterus.        Right eye: No discharge.         Left eye: No discharge.      Conjunctiva/sclera: Conjunctivae normal.   Neck:      Vascular: No JVD.   Cardiovascular:      Rate and Rhythm: Normal rate and regular rhythm.      Heart sounds: Murmur (loud systolic murmur) heard.     No friction rub. No gallop.   Pulmonary:      Effort: Pulmonary effort is normal. No respiratory distress.      Breath sounds: Normal breath sounds. No wheezing or rales.   Chest:      Chest wall: No tenderness.   Abdominal:      General: Bowel sounds are normal. There is no distension.      Palpations: Abdomen is soft. There is no mass.      Tenderness: There is no abdominal tenderness. There is no guarding.      Comments: ?hepatomegaly   Musculoskeletal:         General: No deformity. Normal range of motion.      Cervical back: Normal range of motion.   Skin:     General: Skin is warm and dry.      Findings: No erythema or rash.   Neurological:      Mental Status: He is alert and oriented to person, place, and time.      Coordination: Coordination normal.   Psychiatric:         Mood and Affect: Mood normal.         Behavior: Behavior normal.         Thought Content: Thought content normal.         Judgment: Judgment normal.         Vitals:    03/30/23 1422   BP: 118/68   Pulse: 97   SpO2: 96%   Weight: 84.4 kg (186 lb)     Results Review:   No recent labs on file to review.   No recent abdominal imaging on file.   FOB positive 12/2022.      Assessment / Plan      1. Encounter for screening for malignant neoplasm of colon  His last colonoscopy was in approx 2016 in [place], details  unknown. No family history of colon cancer. Has had positive FOB in the past, relates to known hemorrhoids. Will arrange colonoscopy now for screening.     Will request previous colonoscopy op/path for review    He will have a colonoscopy performed with monitored anesthesia care. The indications, technique, alternatives and potential risk and complications were discussed with the patient including but not limited to bleeding, bowel perforations, missing lesions and anesthetic complications. The patient understands and wishes to proceed with the procedure and has given their verbal consent. Written patient education information was given to the patient. He should follow up in the office after this procedure to discuss the results and further recommendations can be made at that time. The patient will call if they have further questions before procedure.  - Case Request  - Plenvue sample    2. History of alcohol use  3. Thrombocytopenia   Has history of alcoholism, drank beer daily for approx 40 years. No longer drinking alcohol. No known history of any liver disease. Has been told in the past that his platelets are low. No recent labs available for review and no recent abdominal imaging. BMI 26. Does have history of elevated cholesterol but no diabetes. Suspect underlying alcoholic liver disease and advanced fibrosis, even possible cirrhosis.     Will request recent labs for review  Will likely need US abd arranged next visit  Continue to abstain from alcohol use      Follow Up:   Return for follow up after procedure to discuss results.      Yanira Pinon PA-C  [facility]  3/30/2023  18:20 EDT    Dictated Utilizing Dragon Dictation: Part of this note may be an electronic transcription/translation of spoken language to printed text using the Dragon Dictation System.

## 2023-03-31 PROBLEM — Z12.11 ENCOUNTER FOR SCREENING FOR MALIGNANT NEOPLASM OF COLON: Status: ACTIVE | Noted: 2023-03-31

## 2023-06-02 ENCOUNTER — TELEPHONE (OUTPATIENT)
Dept: GASTROENTEROLOGY | Facility: CLINIC | Age: 73
End: 2023-06-02

## 2023-06-06 NOTE — PRE-PROCEDURE INSTRUCTIONS
PAT phone history completed with pt for upcoming procedure on  6/9/23, with Dr. Hunt.    PAT PASS GIVEN/REVIEWED WITH PT.  VERBALIZED UNDERSTANDING OF THE FOLLOWING:  DO NOT EAT, DRINK, SMOKE, USE SMOKELESS TOBACCO OR CHEW GUM AFTER MIDNIGHT THE NIGHT BEFORE SURGERY.  THIS ALSO INCLUDES HARD CANDIES AND MINTS.    DO NOT SHAVE THE AREA TO BE OPERATED ON AT LEAST 48 HOURS PRIOR TO THE PROCEDURE.  DO NOT WEAR MAKE UP OR NAIL POLISH.  DO NOT LEAVE IN ANY PIERCING OR WEAR JEWELRY THE DAY OF SURGERY.      DO NOT USE ADHESIVES IF YOU WEAR DENTURES.    DO NOT WEAR EYE CONTACTS; BRING IN YOUR GLASSES.    ONLY TAKE MEDICATION THE MORNING OF YOUR PROCEDURE IF INSTRUCTED BY YOUR SURGEON WITH ENOUGH WATER TO SWALLOW THE MEDICATION.  IF YOUR SURGEON DID NOT SPECIFY WHICH MEDICATIONS TO TAKE, YOU WILL NEED TO CALL THEIR OFFICE FOR FURTHER INSTRUCTIONS AND DO AS THEY INSTRUCT.    LEAVE ANYTHING YOU CONSIDER VALUABLE AT HOME.    YOU WILL NEED TO ARRANGE FOR SOMEONE TO DRIVE YOU HOME AFTER SURGERY.  IT IS RECOMMENDED THAT YOU DO NOT DRIVE, WORK, DRINK ALCOHOL OR MAKE MAJOR DECISIONS FOR AT LEAST 24 HOURS AFTER YOUR PROCEDURE IS COMPLETE.      THE DAY OF YOUR PROCEDURE, BRING IN THE FOLLOWING IF APPLICABLE:   PICTURE ID AND INSURANCE/MEDICARE OR MEDICAID CARDS/ANY CO-PAY THAT MAY BE DUE   COPY OF ADVANCED DIRECTIVE/LIVING WILL/POWER OR    CPAP/BIPAP/INHALERS   SKIN PREP SHEET   YOUR PREADMISSION TESTING PASS (IF NOT A PHONE HISTORY)

## 2023-06-09 ENCOUNTER — ANESTHESIA EVENT (OUTPATIENT)
Dept: GASTROENTEROLOGY | Facility: HOSPITAL | Age: 73
End: 2023-06-09
Payer: MEDICARE

## 2023-06-09 ENCOUNTER — HOSPITAL ENCOUNTER (OUTPATIENT)
Facility: HOSPITAL | Age: 73
Setting detail: HOSPITAL OUTPATIENT SURGERY
Discharge: HOME | End: 2023-06-09
Attending: INTERNAL MEDICINE | Admitting: INTERNAL MEDICINE
Payer: MEDICARE

## 2023-06-09 ENCOUNTER — ANESTHESIA (OUTPATIENT)
Dept: GASTROENTEROLOGY | Facility: HOSPITAL | Age: 73
End: 2023-06-09
Payer: MEDICARE

## 2023-06-09 VITALS
OXYGEN SATURATION: 96 % | RESPIRATION RATE: 16 BRPM | DIASTOLIC BLOOD PRESSURE: 58 MMHG | HEART RATE: 51 BPM | HEIGHT: 70 IN | TEMPERATURE: 97.6 F | BODY MASS INDEX: 26.48 KG/M2 | WEIGHT: 185 LBS | SYSTOLIC BLOOD PRESSURE: 115 MMHG

## 2023-06-09 DIAGNOSIS — Z12.11 ENCOUNTER FOR SCREENING FOR MALIGNANT NEOPLASM OF COLON: ICD-10-CM

## 2023-06-09 PROCEDURE — 45385 COLONOSCOPY W/LESION REMOVAL: CPT | Performed by: INTERNAL MEDICINE

## 2023-06-09 PROCEDURE — 88305 TISSUE EXAM BY PATHOLOGIST: CPT

## 2023-06-09 PROCEDURE — 25010000002 PROPOFOL 10 MG/ML EMULSION: Performed by: NURSE ANESTHETIST, CERTIFIED REGISTERED

## 2023-06-09 RX ORDER — LIDOCAINE HYDROCHLORIDE 20 MG/ML
INJECTION, SOLUTION INTRAVENOUS AS NEEDED
Status: DISCONTINUED | OUTPATIENT
Start: 2023-06-09 | End: 2023-06-09 | Stop reason: SURG

## 2023-06-09 RX ORDER — SODIUM CHLORIDE 9 MG/ML
30 INJECTION, SOLUTION INTRAVENOUS CONTINUOUS PRN
Status: DISCONTINUED | OUTPATIENT
Start: 2023-06-09 | End: 2023-06-09 | Stop reason: HOSPADM

## 2023-06-09 RX ORDER — PROPOFOL 10 MG/ML
VIAL (ML) INTRAVENOUS AS NEEDED
Status: DISCONTINUED | OUTPATIENT
Start: 2023-06-09 | End: 2023-06-09 | Stop reason: SURG

## 2023-06-09 RX ORDER — SIMETHICONE 20 MG/.3ML
EMULSION ORAL AS NEEDED
Status: DISCONTINUED | OUTPATIENT
Start: 2023-06-09 | End: 2023-06-09 | Stop reason: HOSPADM

## 2023-06-09 RX ADMIN — SODIUM CHLORIDE 30 ML/HR: 9 INJECTION, SOLUTION INTRAVENOUS at 08:14

## 2023-06-09 RX ADMIN — PROPOFOL 100 MG: 10 INJECTION, EMULSION INTRAVENOUS at 09:33

## 2023-06-09 RX ADMIN — LIDOCAINE HYDROCHLORIDE 40 MG: 20 INJECTION, SOLUTION INTRAVENOUS at 09:33

## 2023-06-09 RX ADMIN — PROPOFOL 140 MCG/KG/MIN: 10 INJECTION, EMULSION INTRAVENOUS at 09:33

## 2023-06-09 NOTE — ANESTHESIA PREPROCEDURE EVALUATION
Anesthesia Evaluation     Patient summary reviewed and Nursing notes reviewed   no history of anesthetic complications:   NPO Solid Status: > 8 hours  NPO Liquid Status: > 8 hours           Airway   Mallampati: I  TM distance: >3 FB  Neck ROM: full  No difficulty expected  Dental - normal exam   (+) upper dentures and partials    Pulmonary - normal exam   (+) a smoker Former, COPD, asthma (As child),  Cardiovascular - normal exam    ECG reviewed  Rhythm: regular  Rate: normal    (+) hypertension 2 medications or greater, valvular problems/murmurs, past MI (2012 MI and two stents) , CAD, cardiac stents (2 Stents) more than 12 months ago dysrhythmias, hyperlipidemia    ROS comment: EKG Sinus Bradycardia, 53, LBBB    Neuro/Psych- negative ROS  (+) TIA, headaches  GI/Hepatic/Renal/Endo - negative ROS   (+) renal disease stones    Musculoskeletal     Abdominal    Substance History - negative use     OB/GYN negative ob/gyn ROS         Other   arthritis,     ROS/Med Hx Other: H/H 14.1/40.9  K 4.0  CxR NAD   Cardiac clearance in chart                  Anesthesia Plan    ASA 3     general     (Risks and benefits of general anesthesia discussed including risk of aspiration, recall, dental damage, cardiac or respiratory compromise, or death. All patient questions answered.  Patient told a breathing tube will be used to manage the airway.    Will continue with plan of care.)  intravenous induction     Anesthetic plan, risks, benefits, and alternatives have been provided, discussed and informed consent has been obtained with: patient.

## 2023-06-09 NOTE — DISCHARGE INSTRUCTIONS
- Discharge patient to home (ambulatory).   - High fiber diet.   - Continue present medications.   - Hold ASA for 72 hours  - Await pathology results.   - Repeat colonoscopy in 3 - 5 years for surveillance.   - Return to GI office in 8 weeks.     No pushing, pulling, tugging,  heavy lifting, or strenuous activity.  No major decision making, driving, or drinking alcoholic beverages for 24 hours. ( due to the medications you have  received)  Always use good hand hygiene/washing techniques.  NO driving while taking pain medications.    * if you have an incision:  Check your incision area every day for signs of infection.   Check for:  * more redness, swelling, or pain  *more fluid or blood  *warmth  *pus or bad smell    To assist you in voiding:  Drink plenty of fluids  Listen to running water while attempting to void.    If you are unable to urinate and you have an uncomfortable urge to void or it has been   6 hours since you were discharged, return to the Emergency Room

## 2023-06-09 NOTE — ANESTHESIA POSTPROCEDURE EVALUATION
Patient: Emir Tian    Procedure Summary       Date: 06/09/23 Room / Location: [facility] ENDOSCOPY 2 / [facility] ENDOSCOPY    Anesthesia Start: 0928 Anesthesia Stop: 0955    Procedure: COLONOSCOPY with polypectomy Diagnosis:       Encounter for screening for malignant neoplasm of colon      (Encounter for screening for malignant neoplasm of colon [Z12.11])    Surgeons: Stephanie Hunt MD Provider: Chad Godinez CRNA    Anesthesia Type: general ASA Status: 3            Anesthesia Type: general    Vitals  HR 43  Sat 97  Resp 14  BP 94/44  Temp 98  Post Anesthesia Care and Evaluation    Patient location during evaluation: bedside  Patient participation: complete - patient participated  Level of consciousness: awake and alert  Pain score: 0  Pain management: adequate    Airway patency: patent  Anesthetic complications: No anesthetic complications  PONV Status: none  Cardiovascular status: acceptable  Respiratory status: acceptable  Hydration status: acceptable

## 2023-06-09 NOTE — H&P
[facility]  HISTORY AND PHYSICAL    Patient Name: Emir Tian  : 1950  MRN: [id number]    Chief Complaint:   For screening colonoscopy    History Of Presenting Illness:    Average risk screening    Past Medical History:   Diagnosis Date    Arthritis     Asthma     AS A CHILD    COPD (chronic obstructive pulmonary disease)     Coronary artery disease     Elevated cholesterol     Fractures     Heart attack 2012    2 STENTS    Heart disease     Heart murmur     Hyperlipidemia     Hypertension     Kidney stones 2023    Migraine     Wears dentures     FULL PLATE ON TOP, PARTIAL ON THE BOTTOM    Wears glasses        Past Surgical History:   Procedure Laterality Date    BONE GRAFT Right     around age 12-14.  RIGHT LEG (BELOW THE KNEE)    CARDIAC CATHETERIZATION  ,     COLONOSCOPY  2016    Dr. Vernon Sabillon- 5mm polyp at the hepatic flexure, Diminutive polyp in the proximal sigmoid colon, a few small mouthed diverticula in the sigmoid colon, non-bleeding internal hemorrhoids. Path- Hepatic flexure: tubular adenoma, Sigmoid colon: Hyperplastic polyp.    CORONARY STENT PLACEMENT  2012    ENDOSCOPY      ENDOSCOPY  2016    Dr. Vernon Sabillon- Small hiatus hernia, z-line regular, no gross lesions in esophagus, gastritis, no gross lesions in duodenum. Path- Duodenum second part: Duodenal mucosa without significant abnormality, Stomach Antrum: Chronic gastritis without activity, negative for H-pylori.    SHOULDER ARTHROSCOPY Right 2018    Procedure: SHOULDER ARTHROSCOPY RIGHT WITH SUBACROMIAL DECOMPRESSION, DISTAL CLAVICLE RESECTION, ROTATOR CUFF REPAIR AND BICEPS TENOTOMY;  Surgeon: Dharmesh Sales MD;  Location: [facility];  Service: Orthopedics    TONSILLECTOMY         Social History     Socioeconomic History    Marital status:    Tobacco Use    Smoking status: Former     Packs/day: 0.50     Years: 42.00     Pack years: 21.00     Types: Cigarettes      Quit date: 12/20/2012     Years since quitting: 10.4     Passive exposure: Past    Smokeless tobacco: Never    Tobacco comments:     Off and on with 10 or more year breaks on two occations   Vaping Use    Vaping Use: Never used   Substance and Sexual Activity    Alcohol use: Not Currently     Comment: 6-8 a week    Drug use: No    Sexual activity: Defer       Family History   Problem Relation Age of Onset    Heart attack Father     Hyperlipidemia Father     Hypertension Father        Prior to Admission Medications:  Medications Prior to Admission   Medication Sig Dispense Refill Last Dose    amLODIPine (NORVASC) 5 MG tablet Take 1 tablet by mouth Daily. 90 tablet 3 6/9/2023 at 0530    aspirin 81 MG EC tablet Take 1 tablet by mouth Daily.   6/2/2023    atorvastatin (LIPITOR) 40 MG tablet TAKE 1 TABLET EVERY DAY (Patient taking differently: Take 1 tablet by mouth Every Night.) 90 tablet 0 6/9/2023 at 0530    lisinopril (PRINIVIL,ZESTRIL) 20 MG tablet Take 1 tablet by mouth Daily. 90 tablet 3 6/9/2023 at 0530    PSYLLIUM FIBER PO Take 3 tablets by mouth Every Morning.   6/9/2023 at 0530    chlorthalidone (HYGROTON) 25 MG tablet Take 1 tablet by mouth. One ever sunday 6/4/2023       Allergies:  Allergies   Allergen Reactions    Iodine Solution [Povidone Iodine] Itching        Vitals: Temp:  [98 °F (36.7 °C)] 98 °F (36.7 °C)  Heart Rate:  [52] 52  Resp:  [18] 18  BP: (124)/(64) 124/64    Review Of Systems:  Constitutional:  Negative for chills, fever, and unexpected weight change.  Respiratory:  Negative for cough, chest tightness, shortness of breath, and wheezing.  Cardiovascular:  Negative for chest pain, palpitations, and leg swelling.  Gastrointestinal:  Negative for abdominal distention, abdominal pain, nausea, vomiting.  Neurological:  Negative for weakness, numbness, and headaches.     Physical Exam:    General Appearance:  Alert, cooperative, in no acute distress.   Lungs:   Clear to auscultation,  respirations regular, even and                 unlabored.   Heart:  Regular rhythm and normal rate.   Abdomen:   Normal bowel sounds, no masses, no organomegaly. Soft, nontender, nondistended   Neurologic: Alert and oriented x 3. Moves all four limbs equally       Assessment & Plan     Assessment:  Principal Problem:    Encounter for screening for malignant neoplasm of colon      Plan: COLONOSCOPY FOR SCREENING CPT CODE:  (N/A)     Stephanie Hunt MD  6/9/2023

## 2023-06-12 LAB — REF LAB TEST METHOD: NORMAL

## 2023-08-07 ENCOUNTER — APPOINTMENT (OUTPATIENT)
Dept: GENERAL RADIOLOGY | Facility: HOSPITAL | Age: 73
End: 2023-08-07
Payer: MEDICARE

## 2023-08-07 ENCOUNTER — HOSPITAL ENCOUNTER (EMERGENCY)
Facility: HOSPITAL | Age: 73
Discharge: HOME | End: 2023-08-07
Attending: EMERGENCY MEDICINE | Admitting: EMERGENCY MEDICINE
Payer: MEDICARE

## 2023-08-07 VITALS
WEIGHT: 185 LBS | DIASTOLIC BLOOD PRESSURE: 63 MMHG | HEART RATE: 54 BPM | SYSTOLIC BLOOD PRESSURE: 131 MMHG | TEMPERATURE: 98 F | BODY MASS INDEX: 27.4 KG/M2 | RESPIRATION RATE: 16 BRPM | HEIGHT: 69 IN | OXYGEN SATURATION: 98 %

## 2023-08-07 DIAGNOSIS — R07.9 CHEST PAIN, UNSPECIFIED TYPE: Primary | ICD-10-CM

## 2023-08-07 LAB
ALBUMIN SERPL-MCNC: 4.1 G/DL (ref 3.5–5.2)
ALBUMIN/GLOB SERPL: 1.6 G/DL
ALP SERPL-CCNC: 77 U/L (ref 39–117)
ALT SERPL W P-5'-P-CCNC: 24 U/L (ref 1–41)
ANION GAP SERPL CALCULATED.3IONS-SCNC: 10.6 MMOL/L (ref 5–15)
AST SERPL-CCNC: 31 U/L (ref 1–40)
BASOPHILS # BLD AUTO: 0.03 10*3/MM3 (ref 0–0.2)
BASOPHILS NFR BLD AUTO: 0.4 % (ref 0–1.5)
BILIRUB SERPL-MCNC: 1 MG/DL (ref 0–1.2)
BUN SERPL-MCNC: 19 MG/DL (ref 8–23)
BUN/CREAT SERPL: 17.3 (ref 7–25)
CALCIUM SPEC-SCNC: 9 MG/DL (ref 8.6–10.5)
CHLORIDE SERPL-SCNC: 103 MMOL/L (ref 98–107)
CO2 SERPL-SCNC: 23.4 MMOL/L (ref 22–29)
CREAT SERPL-MCNC: 1.1 MG/DL (ref 0.76–1.27)
DEPRECATED RDW RBC AUTO: 40.7 FL (ref 37–54)
EGFRCR SERPLBLD CKD-EPI 2021: 71.3 ML/MIN/1.73
EOSINOPHIL # BLD AUTO: 0.06 10*3/MM3 (ref 0–0.4)
EOSINOPHIL NFR BLD AUTO: 0.7 % (ref 0.3–6.2)
ERYTHROCYTE [DISTWIDTH] IN BLOOD BY AUTOMATED COUNT: 13.2 % (ref 12.3–15.4)
GEN 5 2HR TROPONIN T REFLEX: 8 NG/L
GLOBULIN UR ELPH-MCNC: 2.5 GM/DL
GLUCOSE SERPL-MCNC: 123 MG/DL (ref 65–99)
HCT VFR BLD AUTO: 39.7 % (ref 37.5–51)
HGB BLD-MCNC: 14 G/DL (ref 13–17.7)
HOLD SPECIMEN: NORMAL
HOLD SPECIMEN: NORMAL
IMM GRANULOCYTES # BLD AUTO: 0.02 10*3/MM3 (ref 0–0.05)
IMM GRANULOCYTES NFR BLD AUTO: 0.2 % (ref 0–0.5)
LYMPHOCYTES # BLD AUTO: 0.95 10*3/MM3 (ref 0.7–3.1)
LYMPHOCYTES NFR BLD AUTO: 11.8 % (ref 19.6–45.3)
MCH RBC QN AUTO: 30 PG (ref 26.6–33)
MCHC RBC AUTO-ENTMCNC: 35.3 G/DL (ref 31.5–35.7)
MCV RBC AUTO: 85 FL (ref 79–97)
MONOCYTES # BLD AUTO: 0.56 10*3/MM3 (ref 0.1–0.9)
MONOCYTES NFR BLD AUTO: 7 % (ref 5–12)
NEUTROPHILS NFR BLD AUTO: 6.43 10*3/MM3 (ref 1.7–7)
NEUTROPHILS NFR BLD AUTO: 79.9 % (ref 42.7–76)
NRBC BLD AUTO-RTO: 0 /100 WBC (ref 0–0.2)
PLATELET # BLD AUTO: 111 10*3/MM3 (ref 140–450)
PMV BLD AUTO: 9 FL (ref 6–12)
POTASSIUM SERPL-SCNC: 4.1 MMOL/L (ref 3.5–5.2)
PROT SERPL-MCNC: 6.6 G/DL (ref 6–8.5)
RBC # BLD AUTO: 4.67 10*6/MM3 (ref 4.14–5.8)
SODIUM SERPL-SCNC: 137 MMOL/L (ref 136–145)
TROPONIN T DELTA: 0 NG/L
TROPONIN T SERPL HS-MCNC: 8 NG/L
WBC NRBC COR # BLD: 8.05 10*3/MM3 (ref 3.4–10.8)
WHOLE BLOOD HOLD COAG: NORMAL
WHOLE BLOOD HOLD SPECIMEN: NORMAL

## 2023-08-07 PROCEDURE — 85025 COMPLETE CBC W/AUTO DIFF WBC: CPT

## 2023-08-07 PROCEDURE — 71045 X-RAY EXAM CHEST 1 VIEW: CPT

## 2023-08-07 PROCEDURE — 80053 COMPREHEN METABOLIC PANEL: CPT | Performed by: EMERGENCY MEDICINE

## 2023-08-07 PROCEDURE — 93005 ELECTROCARDIOGRAM TRACING: CPT

## 2023-08-07 PROCEDURE — 93005 ELECTROCARDIOGRAM TRACING: CPT | Performed by: EMERGENCY MEDICINE

## 2023-08-07 PROCEDURE — 99284 EMERGENCY DEPT VISIT MOD MDM: CPT

## 2023-08-07 PROCEDURE — 84484 ASSAY OF TROPONIN QUANT: CPT | Performed by: EMERGENCY MEDICINE

## 2023-08-07 PROCEDURE — 36415 COLL VENOUS BLD VENIPUNCTURE: CPT

## 2023-08-07 RX ORDER — SODIUM CHLORIDE 0.9 % (FLUSH) 0.9 %
10 SYRINGE (ML) INJECTION AS NEEDED
Status: DISCONTINUED | OUTPATIENT
Start: 2023-08-07 | End: 2023-08-07 | Stop reason: HOSPADM

## 2023-08-07 RX ORDER — ASPIRIN 325 MG
325 TABLET ORAL ONCE
Status: COMPLETED | OUTPATIENT
Start: 2023-08-07 | End: 2023-08-07

## 2023-08-07 RX ADMIN — ASPIRIN 325 MG: 325 TABLET ORAL at 17:13

## 2023-08-08 NOTE — ED PROVIDER NOTES
HPI: Emir Tian is a 73 y.o. male who presents to the emergency department complaining of chest pain.  Patient states that about 3 hours prior to arrival started having some chest discomfort.  This is a vague nonspecific pain.  There are no particular alleviating or aggravating factors.  Some nausea.  No shortness of breath or other complaints.  Feels similar to when he had a heart attack 11 years ago.  Pain has since subsided.      REVIEW OF SYSTEMS: All other systems reviewed and are negative     PAST MEDICAL HISTORY:   Past Medical History:   Diagnosis Date    Arthritis     Asthma     AS A CHILD    COPD (chronic obstructive pulmonary disease)     Coronary artery disease     Elevated cholesterol     Fractures     Heart attack 12/2012    2 STENTS    Heart disease     Heart murmur     Hyperlipidemia     Hypertension     Kidney stones 06/06/2023    Migraine     Wears dentures     FULL PLATE ON TOP, PARTIAL ON THE BOTTOM    Wears glasses         FAMILY HISTORY:   Family History   Problem Relation Age of Onset    Heart attack Father     Hyperlipidemia Father     Hypertension Father         SOCIAL HISTORY:   Social History     Socioeconomic History    Marital status:    Tobacco Use    Smoking status: Former     Packs/day: 0.50     Years: 42.00     Pack years: 21.00     Types: Cigarettes     Quit date: 12/20/2012     Years since quitting: 10.6     Passive exposure: Past    Smokeless tobacco: Never    Tobacco comments:     Off and on with 10 or more year breaks on two occations   Vaping Use    Vaping Use: Never used   Substance and Sexual Activity    Alcohol use: Not Currently     Comment: 6-8 a week    Drug use: No    Sexual activity: Defer        SURGICAL HISTORY:   Past Surgical History:   Procedure Laterality Date    BONE GRAFT Right     around age 12-14.  RIGHT LEG (BELOW THE KNEE)    CARDIAC CATHETERIZATION  2012, 2017    COLONOSCOPY  02/09/2016    Dr. Vernon Sabillon- 5mm polyp at the hepatic  flexure, Diminutive polyp in the proximal sigmoid colon, a few small mouthed diverticula in the sigmoid colon, non-bleeding internal hemorrhoids. Path- Hepatic flexure: tubular adenoma, Sigmoid colon: Hyperplastic polyp.    COLONOSCOPY N/A 6/9/2023    Procedure: COLONOSCOPY with polypectomy;  Surgeon: Stephanie Hunt MD;  Location: [facility] ENDOSCOPY;  Service: Gastroenterology;  Laterality: N/A;    CORONARY STENT PLACEMENT  12/21/2012    ENDOSCOPY      ENDOSCOPY  02/09/2016    Dr. Vernon Sabillon- Small hiatus hernia, z-line regular, no gross lesions in esophagus, gastritis, no gross lesions in duodenum. Path- Duodenum second part: Duodenal mucosa without significant abnormality, Stomach Antrum: Chronic gastritis without activity, negative for H-pylori.    SHOULDER ARTHROSCOPY Right 09/05/2018    Procedure: SHOULDER ARTHROSCOPY RIGHT WITH SUBACROMIAL DECOMPRESSION, DISTAL CLAVICLE RESECTION, ROTATOR CUFF REPAIR AND BICEPS TENOTOMY;  Surgeon: Dharmesh Sales MD;  Location: [facility] OR;  Service: Orthopedics    TONSILLECTOMY          ALLERGIES: Iodine solution [povidone iodine]       PHYSICAL EXAM:   VITAL SIGNS:   Vitals:    08/07/23 1945   BP: 131/63   Pulse: 54   Resp:    Temp:    SpO2: 98%      CONSTITUTIONAL: Awake, well appearing, nontoxic   HENT: Atraumatic, normocephalic, oral mucosa moist, airway patent. Nares patent without drainage. External ears normal.   EYES: Conjunctivae clear, EOMI, PERRL   NECK: Trachea midline, nontender, supple   CARDIOVASCULAR: Normal heart rate, Normal rhythm.  PULMONARY/CHEST: Normal work of breathing. Clear to auscultation, no rhonchi, wheezes, or rales.  ABDOMINAL: Nondistended, soft, nontender, no rebound or guarding.  NEUROLOGIC: Nonfocal, moves all four extremities, no gross sensory or motor deficits.   EXTREMITIES: No clubbing, cyanosis, or edema   SKIN: Warm, Dry, No erythema, No rash       ED COURSE / MEDICAL DECISION MAKING:     Emir Tian is a 73 y.o. male  who presents to the emergency department for evaluation of chest pain.  Well-developed, well-nourished man in no distress with exam as above.  His vital signs are normal.  His oxygen saturation is normal on room air at 94%.  His exam is nonfocal.  Will obtain labs, EKG and chest x-ray.  Patient will receive aspirin.  Disposition pending.    Differential diagnosis includes ACS, GERD, anxiety among other etiologies.    ED Course as of 08/08/23 0720   Mon Aug 07, 2023   1656 EKG interpreted by me: Sinus bradycardia, left bundle branch block, no obvious acute ST elevations, this is an abnormal EKG, this is similar to previous [MP]      ED Course User Index  [MP] Trevor Sabillon MD     Work-up is overall reassuring.  Serial enzymes are negative.  Patient has been requesting to leave.  I do feel he is safe for discharge home.  Advised outpatient cardiology follow-up.  He is comfortable with and understanding of the plan.    Final diagnoses:   Chest pain, unspecified type        Trevor Sabillon MD  08/08/23 0720

## 2023-12-04 ENCOUNTER — OFFICE VISIT (OUTPATIENT)
Dept: GASTROENTEROLOGY | Facility: CLINIC | Age: 73
End: 2023-12-04
Payer: MEDICARE

## 2023-12-04 VITALS
DIASTOLIC BLOOD PRESSURE: 60 MMHG | HEART RATE: 54 BPM | BODY MASS INDEX: 28.35 KG/M2 | WEIGHT: 192 LBS | OXYGEN SATURATION: 96 % | SYSTOLIC BLOOD PRESSURE: 122 MMHG

## 2023-12-04 DIAGNOSIS — K57.30 DIVERTICULOSIS OF COLON: ICD-10-CM

## 2023-12-04 DIAGNOSIS — Z87.898 HISTORY OF ALCOHOL USE: ICD-10-CM

## 2023-12-04 DIAGNOSIS — D12.6 TUBULAR ADENOMA OF COLON: Primary | ICD-10-CM

## 2023-12-04 DIAGNOSIS — D69.6 THROMBOCYTOPENIA: ICD-10-CM

## 2023-12-04 PROCEDURE — 3074F SYST BP LT 130 MM HG: CPT | Performed by: PHYSICIAN ASSISTANT

## 2023-12-04 PROCEDURE — 1159F MED LIST DOCD IN RCRD: CPT | Performed by: PHYSICIAN ASSISTANT

## 2023-12-04 PROCEDURE — 99214 OFFICE O/P EST MOD 30 MIN: CPT | Performed by: PHYSICIAN ASSISTANT

## 2023-12-04 PROCEDURE — 3078F DIAST BP <80 MM HG: CPT | Performed by: PHYSICIAN ASSISTANT

## 2023-12-04 PROCEDURE — 1160F RVW MEDS BY RX/DR IN RCRD: CPT | Performed by: PHYSICIAN ASSISTANT

## 2023-12-04 NOTE — PROGRESS NOTES
Follow Up Note     Date: 2023   Patient Name: Emir Tian  MRN: [id number]  : 1950     Primary Care Provider: Rolly Neely MD     Chief Complaint   Patient presents with    Results     Colonoscopy     History of present illness:   2023  Emir Tian is a 73 y.o. male who is here today for follow up regarding Results (Colonoscopy).    He had colonoscopy since last visit, would like to discuss those results. He denies any current GI complaints. Has been informed of thrombocytopenia for years. Still no alcohol use.     Interval History:  3/30/2023  Has had blood in his stool on occult stool tests in the past. Admits known hemorrhoids. He does have mild and intermittent bright red blood in very small amounts on the tissue intermittently. He had EGD and colonoscopy in approx 2016 in [place], had internal hemorrhoids at that time. Also has history of external hemorrhoid surgery numerous years ago. No history of anemia. No family history of colon cancer.      Does report a past history of heavy alcohol use, drank beer daily at least 12-30 cans, from age 21 to 61. Cut back on amount and drank some up until age 68. No alcohol use now. Has been told his platelets are low on previous labs. No known history of any liver disease.      No current reflux symptoms. No abdominal pain. No change in bowel habits, has regular daily stools. Takes ASA daily, has history of MI and cardiac stents numerous years ago.     Subjective     Past Medical History:   Diagnosis Date    Arthritis     Asthma     AS A CHILD    COPD (chronic obstructive pulmonary disease)     Coronary artery disease     Elevated cholesterol     Fractures     Heart attack 2012    2 STENTS    Heart disease     Heart murmur     Hyperlipidemia     Hypertension     Kidney stones 2023    Migraine     Wears dentures     FULL PLATE ON TOP, PARTIAL ON THE BOTTOM    Wears glasses      Past Surgical History:   Procedure Laterality  Date    BONE GRAFT Right     around age 12-14.  RIGHT LEG (BELOW THE KNEE)    CARDIAC CATHETERIZATION  2012, 2017    COLONOSCOPY  02/09/2016    Dr. Vernon Sabillon- 5mm polyp at the hepatic flexure, Diminutive polyp in the proximal sigmoid colon, a few small mouthed diverticula in the sigmoid colon, non-bleeding internal hemorrhoids. Path- Hepatic flexure: tubular adenoma, Sigmoid colon: Hyperplastic polyp.    COLONOSCOPY N/A 6/9/2023    Procedure: COLONOSCOPY with polypectomy;  Surgeon: Stephanie Hunt MD;  Location: [facility] ENDOSCOPY;  Service: Gastroenterology;  Laterality: N/A;    CORONARY STENT PLACEMENT  12/21/2012    ENDOSCOPY      ENDOSCOPY  02/09/2016    Dr. Vernon Sabillon- Small hiatus hernia, z-line regular, no gross lesions in esophagus, gastritis, no gross lesions in duodenum. Path- Duodenum second part: Duodenal mucosa without significant abnormality, Stomach Antrum: Chronic gastritis without activity, negative for H-pylori.    SHOULDER ARTHROSCOPY Right 09/05/2018    Procedure: SHOULDER ARTHROSCOPY RIGHT WITH SUBACROMIAL DECOMPRESSION, DISTAL CLAVICLE RESECTION, ROTATOR CUFF REPAIR AND BICEPS TENOTOMY;  Surgeon: Dharmesh Sales MD;  Location: [facility] OR;  Service: Orthopedics    TONSILLECTOMY       Family History   Problem Relation Age of Onset    Heart attack Father     Hyperlipidemia Father     Hypertension Father      Social History     Socioeconomic History    Marital status:    Tobacco Use    Smoking status: Former     Packs/day: 0.50     Years: 42.00     Additional pack years: 0.00     Total pack years: 21.00     Types: Cigarettes     Quit date: 12/20/2012     Years since quitting: 10.9     Passive exposure: Past    Smokeless tobacco: Never    Tobacco comments:     Off and on with 10 or more year breaks on two occations   Vaping Use    Vaping Use: Never used   Substance and Sexual Activity    Alcohol use: Not Currently     Comment: 6-8 a week    Drug use: No    Sexual activity: Defer      Current Outpatient Medications:     amLODIPine (NORVASC) 5 MG tablet, Take 1 tablet by mouth Daily., Disp: 90 tablet, Rfl: 3    aspirin 81 MG EC tablet, Take 1 tablet by mouth Daily., Disp: , Rfl:     atorvastatin (LIPITOR) 40 MG tablet, TAKE 1 TABLET EVERY DAY (Patient taking differently: Take 1 tablet by mouth Every Night.), Disp: 90 tablet, Rfl: 0    chlorthalidone (HYGROTON) 25 MG tablet, Take 1 tablet by mouth. One ever sunday, Disp: , Rfl:     lisinopril (PRINIVIL,ZESTRIL) 20 MG tablet, Take 1 tablet by mouth Daily., Disp: 90 tablet, Rfl: 3    PSYLLIUM FIBER PO, Take 3 tablets by mouth Every Morning., Disp: , Rfl:     Allergies   Allergen Reactions    Iodine Solution [Povidone Iodine] Itching     The following portions of the patient's history were reviewed and updated as appropriate: allergies, current medications, past family history, past medical history, past social history, past surgical history and problem list.    Objective     Physical Exam  Constitutional:       General: He is not in acute distress.     Appearance: Normal appearance. He is well-developed. He is not diaphoretic.   HENT:      Head: Normocephalic and atraumatic.      Right Ear: External ear normal.      Left Ear: External ear normal.      Nose: Nose normal.   Eyes:      General: No scleral icterus.        Right eye: No discharge.         Left eye: No discharge.      Conjunctiva/sclera: Conjunctivae normal.   Neck:      Trachea: No tracheal deviation.   Pulmonary:      Effort: Pulmonary effort is normal. No respiratory distress.   Abdominal:      General: Bowel sounds are normal. There is no distension.      Palpations: Abdomen is soft. There is no mass.      Tenderness: There is no abdominal tenderness. There is no guarding.      Hernia: No hernia is present.   Musculoskeletal:         General: Normal range of motion.      Cervical back: Normal range of motion.   Skin:     Coloration: Skin is not pale.      Findings: No erythema or  rash.   Neurological:      Mental Status: He is alert and oriented to person, place, and time.      Coordination: Coordination normal.   Psychiatric:         Mood and Affect: Mood normal.         Behavior: Behavior normal.         Thought Content: Thought content normal.         Judgment: Judgment normal.       Vitals:    12/04/23 0941   BP: 122/60   Pulse: 54   SpO2: 96%   Weight: 87.1 kg (192 lb)     Results Review:   I reviewed the patient's new clinical results.    Admission on 08/07/2023, Discharged on 08/07/2023   Component Date Value Ref Range Status    Glucose 08/07/2023 123 (H)  65 - 99 mg/dL Final    BUN 08/07/2023 19  8 - 23 mg/dL Final    Creatinine 08/07/2023 1.10  0.76 - 1.27 mg/dL Final    Sodium 08/07/2023 137  136 - 145 mmol/L Final    Potassium 08/07/2023 4.1  3.5 - 5.2 mmol/L Final    Chloride 08/07/2023 103  98 - 107 mmol/L Final    CO2 08/07/2023 23.4  22.0 - 29.0 mmol/L Final    Calcium 08/07/2023 9.0  8.6 - 10.5 mg/dL Final    Total Protein 08/07/2023 6.6  6.0 - 8.5 g/dL Final    Albumin 08/07/2023 4.1  3.5 - 5.2 g/dL Final    ALT (SGPT) 08/07/2023 24  1 - 41 U/L Final    AST (SGOT) 08/07/2023 31  1 - 40 U/L Final    Alkaline Phosphatase 08/07/2023 77  39 - 117 U/L Final    Total Bilirubin 08/07/2023 1.0  0.0 - 1.2 mg/dL Final    Globulin 08/07/2023 2.5  gm/dL Final    A/G Ratio 08/07/2023 1.6  g/dL Final    BUN/Creatinine Ratio 08/07/2023 17.3  7.0 - 25.0 Final    Anion Gap 08/07/2023 10.6  5.0 - 15.0 mmol/L Final    eGFR 08/07/2023 71.3  >60.0 mL/min/1.73 Final    HS Troponin T 08/07/2023 8  <15 ng/L Final    WBC 08/07/2023 8.05  3.40 - 10.80 10*3/mm3 Final    RBC 08/07/2023 4.67  4.14 - 5.80 10*6/mm3 Final    Hemoglobin 08/07/2023 14.0  13.0 - 17.7 g/dL Final    Hematocrit 08/07/2023 39.7  37.5 - 51.0 % Final    MCV 08/07/2023 85.0  79.0 - 97.0 fL Final    MCH 08/07/2023 30.0  26.6 - 33.0 pg Final    MCHC 08/07/2023 35.3  31.5 - 35.7 g/dL Final    RDW 08/07/2023 13.2  12.3 - 15.4 % Final     RDW-SD 08/07/2023 40.7  37.0 - 54.0 fl Final    MPV 08/07/2023 9.0  6.0 - 12.0 fL Final    Platelets 08/07/2023 111 (L)  140 - 450 10*3/mm3 Final    HS Troponin T 08/07/2023 8  <15 ng/L Final    Troponin T Delta 08/07/2023 0  >=-4 - <+4 ng/L Final      Labs 12/19/2022 total cholesterol 140, LDL cholesterol 81, triglycerides 125, hemoglobin 14.2, hematocrit 42.0, MCV 87, platelets 130,000, albumin 4.2, alkaline phosphatase 73, ALT 15, AST 18, total bilirubin 0.9, creatinine 0.96, GFR 85, glucose 105, sodium 140.    Colonoscopy by Dr. Hunt 6/9/2023  - Preparation of the colon was fair.  - Hemorrhoids found on perianal exam.  - One 3 mm polyp at the hepatic flexure, removed with a hot snare. Resected and retrieved.  - One 4 mm polyp in the proximal descending colon, removed with a hot snare. Resected and retrieved.  - One 3 mm polyp in the proximal descending colon, removed with a hot snare. Resected and retrieved.  - One 3 mm polyp in the proximal sigmoid colon, removed with a hot snare. Resected and retrieved.  - One 3 mm polyp in the rectum, removed with a hot snare. Resected and retrieved.  - Non-bleeding external and internal hemorrhoids.  - The examined portion of the ileum was normal.  - Diverticulosis in the sigmoid colon.  - Patchy areas of varicosed veins throughout the colon  Pathology DIAGNOSIS:  A.   HEPATIC FLEXURE POLYP:  Adenomatous polyp (tubular adenoma)  Negative for high-grade dysplasia  B.   DESCENDING COLON POLYP, X2:  Adenomatous polyps (tubular adenomas)  Negative for high-grade dysplasia  C.   SIGMOID COLON POLYP, AND RECTAL:  Hyperplastic polyps     Assessment / Plan      1. Tubular adenoma of colon  2. Diverticulosis of colon  Colonoscopy 6/2023 fair prep, had 5 small colon polyps removed, 3/5 polyps were tubular adenomas without dysplasia. Sigmoid and rectal polyps were hyperplastic. No family history of colon cancer. Had diverticulosis of the colon noted as well.     High fiber  diet  Repeat colonoscopy in 3 - 5 years for surveillance, due 6/2026    3. History of alcohol use  4. Thrombocytopenia  Has history of alcoholism, drank beer daily for approx 40 years. No longer drinking alcohol. No known history of any liver disease. Has been told in the past that his platelets are low. Labs 8/2023 platelets 111,000. Liver enzymes normal. No recent abdominal imaging. BMI 28. Does have history of elevated cholesterol but no diabetes. Suspect underlying alcoholic liver disease and advanced fibrosis, even possible cirrhosis.      US abd to be arranged  Continue to abstain from alcohol use  Mediterranean diet  Work on weight loss, lose 10 lbs in the next 6 months     - US Abdomen Complete; Future    Follow Up:   Follow up dependent on results, he will be called with next step in management.    Yanira Pinon PA-C  [facility] [place]  12/4/2023  16:52 EST    Dictated Utilizing Dragon Dictation: Part of this note may be an electronic transcription/translation of spoken language to printed text using the Dragon Dictation System.

## 2023-12-04 NOTE — LETTER
2023     Rolly Neely MD  [address] [facility] [place] [address]    Patient: Emir Tian   YOB: 1950   Date of Visit: 2023       Dear Rolly Neely MD    Emir Tian was in my office today. Below is a copy of my note.    If you have questions, please do not hesitate to call me. I look forward to following Emir along with you.         Sincerely,        Yanira Pinon PA-C        CC: No Recipients         Follow Up Note     Date: 2023   Patient Name: Emir Tian  MRN: [id number]  : 1950     Primary Care Provider: Rolly Neely MD     Chief Complaint   Patient presents with   • Results     Colonoscopy     History of present illness:   2023  Emir Tian is a 73 y.o. male who is here today for follow up regarding Results (Colonoscopy).    He had colonoscopy since last visit, would like to discuss those results. He denies any current GI complaints. Has been informed of thrombocytopenia for years. Still no alcohol use.     Interval History:  3/30/2023  Has had blood in his stool on occult stool tests in the past. Admits known hemorrhoids. He does have mild and intermittent bright red blood in very small amounts on the tissue intermittently. He had EGD and colonoscopy in approx  in [place], had internal hemorrhoids at that time. Also has history of external hemorrhoid surgery numerous years ago. No history of anemia. No family history of colon cancer.      Does report a past history of heavy alcohol use, drank beer daily at least 12-30 cans, from age 21 to 61. Cut back on amount and drank some up until age 68. No alcohol use now. Has been told his platelets are low on previous labs. No known history of any liver disease.      No current reflux symptoms. No abdominal pain. No change in bowel habits, has regular daily stools. Takes ASA daily, has history of MI and cardiac stents numerous years ago.     Subjective     Past Medical History:    Diagnosis Date   • Arthritis    • Asthma     AS A CHILD   • COPD (chronic obstructive pulmonary disease)    • Coronary artery disease    • Elevated cholesterol    • Fractures    • Heart attack 12/2012    2 STENTS   • Heart disease    • Heart murmur    • Hyperlipidemia    • Hypertension    • Kidney stones 06/06/2023   • Migraine    • Wears dentures     FULL PLATE ON TOP, PARTIAL ON THE BOTTOM   • Wears glasses      Past Surgical History:   Procedure Laterality Date   • BONE GRAFT Right     around age 12-14.  RIGHT LEG (BELOW THE KNEE)   • CARDIAC CATHETERIZATION  2012, 2017   • COLONOSCOPY  02/09/2016    Dr. Vernon Sabillon- 5mm polyp at the hepatic flexure, Diminutive polyp in the proximal sigmoid colon, a few small mouthed diverticula in the sigmoid colon, non-bleeding internal hemorrhoids. Path- Hepatic flexure: tubular adenoma, Sigmoid colon: Hyperplastic polyp.   • COLONOSCOPY N/A 6/9/2023    Procedure: COLONOSCOPY with polypectomy;  Surgeon: Stephanie Hunt MD;  Location: [facility] ENDOSCOPY;  Service: Gastroenterology;  Laterality: N/A;   • CORONARY STENT PLACEMENT  12/21/2012   • ENDOSCOPY     • ENDOSCOPY  02/09/2016    Dr. Vernon Sabillon- Small hiatus hernia, z-line regular, no gross lesions in esophagus, gastritis, no gross lesions in duodenum. Path- Duodenum second part: Duodenal mucosa without significant abnormality, Stomach Antrum: Chronic gastritis without activity, negative for H-pylori.   • SHOULDER ARTHROSCOPY Right 09/05/2018    Procedure: SHOULDER ARTHROSCOPY RIGHT WITH SUBACROMIAL DECOMPRESSION, DISTAL CLAVICLE RESECTION, ROTATOR CUFF REPAIR AND BICEPS TENOTOMY;  Surgeon: Dharmesh Sales MD;  Location: [facility] OR;  Service: Orthopedics   • TONSILLECTOMY       Family History   Problem Relation Age of Onset   • Heart attack Father    • Hyperlipidemia Father    • Hypertension Father      Social History     Socioeconomic History   • Marital status:    Tobacco Use   • Smoking status: Former      Packs/day: 0.50     Years: 42.00     Additional pack years: 0.00     Total pack years: 21.00     Types: Cigarettes     Quit date: 12/20/2012     Years since quitting: 10.9     Passive exposure: Past   • Smokeless tobacco: Never   • Tobacco comments:     Off and on with 10 or more year breaks on two occations   Vaping Use   • Vaping Use: Never used   Substance and Sexual Activity   • Alcohol use: Not Currently     Comment: 6-8 a week   • Drug use: No   • Sexual activity: Defer     Current Outpatient Medications:   •  amLODIPine (NORVASC) 5 MG tablet, Take 1 tablet by mouth Daily., Disp: 90 tablet, Rfl: 3  •  aspirin 81 MG EC tablet, Take 1 tablet by mouth Daily., Disp: , Rfl:   •  atorvastatin (LIPITOR) 40 MG tablet, TAKE 1 TABLET EVERY DAY (Patient taking differently: Take 1 tablet by mouth Every Night.), Disp: 90 tablet, Rfl: 0  •  chlorthalidone (HYGROTON) 25 MG tablet, Take 1 tablet by mouth. One ever sunday, Disp: , Rfl:   •  lisinopril (PRINIVIL,ZESTRIL) 20 MG tablet, Take 1 tablet by mouth Daily., Disp: 90 tablet, Rfl: 3  •  PSYLLIUM FIBER PO, Take 3 tablets by mouth Every Morning., Disp: , Rfl:     Allergies   Allergen Reactions   • Iodine Solution [Povidone Iodine] Itching     The following portions of the patient's history were reviewed and updated as appropriate: allergies, current medications, past family history, past medical history, past social history, past surgical history and problem list.    Objective     Physical Exam  Constitutional:       General: He is not in acute distress.     Appearance: Normal appearance. He is well-developed. He is not diaphoretic.   HENT:      Head: Normocephalic and atraumatic.      Right Ear: External ear normal.      Left Ear: External ear normal.      Nose: Nose normal.   Eyes:      General: No scleral icterus.        Right eye: No discharge.         Left eye: No discharge.      Conjunctiva/sclera: Conjunctivae normal.   Neck:      Trachea: No tracheal deviation.    Pulmonary:      Effort: Pulmonary effort is normal. No respiratory distress.   Abdominal:      General: Bowel sounds are normal. There is no distension.      Palpations: Abdomen is soft. There is no mass.      Tenderness: There is no abdominal tenderness. There is no guarding.      Hernia: No hernia is present.   Musculoskeletal:         General: Normal range of motion.      Cervical back: Normal range of motion.   Skin:     Coloration: Skin is not pale.      Findings: No erythema or rash.   Neurological:      Mental Status: He is alert and oriented to person, place, and time.      Coordination: Coordination normal.   Psychiatric:         Mood and Affect: Mood normal.         Behavior: Behavior normal.         Thought Content: Thought content normal.         Judgment: Judgment normal.       Vitals:    12/04/23 0941   BP: 122/60   Pulse: 54   SpO2: 96%   Weight: 87.1 kg (192 lb)     Results Review:   I reviewed the patient's new clinical results.    Admission on 08/07/2023, Discharged on 08/07/2023   Component Date Value Ref Range Status   • Glucose 08/07/2023 123 (H)  65 - 99 mg/dL Final   • BUN 08/07/2023 19  8 - 23 mg/dL Final   • Creatinine 08/07/2023 1.10  0.76 - 1.27 mg/dL Final   • Sodium 08/07/2023 137  136 - 145 mmol/L Final   • Potassium 08/07/2023 4.1  3.5 - 5.2 mmol/L Final   • Chloride 08/07/2023 103  98 - 107 mmol/L Final   • CO2 08/07/2023 23.4  22.0 - 29.0 mmol/L Final   • Calcium 08/07/2023 9.0  8.6 - 10.5 mg/dL Final   • Total Protein 08/07/2023 6.6  6.0 - 8.5 g/dL Final   • Albumin 08/07/2023 4.1  3.5 - 5.2 g/dL Final   • ALT (SGPT) 08/07/2023 24  1 - 41 U/L Final   • AST (SGOT) 08/07/2023 31  1 - 40 U/L Final   • Alkaline Phosphatase 08/07/2023 77  39 - 117 U/L Final   • Total Bilirubin 08/07/2023 1.0  0.0 - 1.2 mg/dL Final   • Globulin 08/07/2023 2.5  gm/dL Final   • A/G Ratio 08/07/2023 1.6  g/dL Final   • BUN/Creatinine Ratio 08/07/2023 17.3  7.0 - 25.0 Final   • Anion Gap 08/07/2023 10.6   5.0 - 15.0 mmol/L Final   • eGFR 08/07/2023 71.3  >60.0 mL/min/1.73 Final   • HS Troponin T 08/07/2023 8  <15 ng/L Final   • WBC 08/07/2023 8.05  3.40 - 10.80 10*3/mm3 Final   • RBC 08/07/2023 4.67  4.14 - 5.80 10*6/mm3 Final   • Hemoglobin 08/07/2023 14.0  13.0 - 17.7 g/dL Final   • Hematocrit 08/07/2023 39.7  37.5 - 51.0 % Final   • MCV 08/07/2023 85.0  79.0 - 97.0 fL Final   • MCH 08/07/2023 30.0  26.6 - 33.0 pg Final   • MCHC 08/07/2023 35.3  31.5 - 35.7 g/dL Final   • RDW 08/07/2023 13.2  12.3 - 15.4 % Final   • RDW-SD 08/07/2023 40.7  37.0 - 54.0 fl Final   • MPV 08/07/2023 9.0  6.0 - 12.0 fL Final   • Platelets 08/07/2023 111 (L)  140 - 450 10*3/mm3 Final   • HS Troponin T 08/07/2023 8  <15 ng/L Final   • Troponin T Delta 08/07/2023 0  >=-4 - <+4 ng/L Final      Labs 12/19/2022 total cholesterol 140, LDL cholesterol 81, triglycerides 125, hemoglobin 14.2, hematocrit 42.0, MCV 87, platelets 130,000, albumin 4.2, alkaline phosphatase 73, ALT 15, AST 18, total bilirubin 0.9, creatinine 0.96, GFR 85, glucose 105, sodium 140.    Colonoscopy by Dr. Hunt 6/9/2023  - Preparation of the colon was fair.  - Hemorrhoids found on perianal exam.  - One 3 mm polyp at the hepatic flexure, removed with a hot snare. Resected and retrieved.  - One 4 mm polyp in the proximal descending colon, removed with a hot snare. Resected and retrieved.  - One 3 mm polyp in the proximal descending colon, removed with a hot snare. Resected and retrieved.  - One 3 mm polyp in the proximal sigmoid colon, removed with a hot snare. Resected and retrieved.  - One 3 mm polyp in the rectum, removed with a hot snare. Resected and retrieved.  - Non-bleeding external and internal hemorrhoids.  - The examined portion of the ileum was normal.  - Diverticulosis in the sigmoid colon.  - Patchy areas of varicosed veins throughout the colon  Pathology DIAGNOSIS:  A.   HEPATIC FLEXURE POLYP:  Adenomatous polyp (tubular adenoma)  Negative for high-grade  dysplasia  B.   DESCENDING COLON POLYP, X2:  Adenomatous polyps (tubular adenomas)  Negative for high-grade dysplasia  C.   SIGMOID COLON POLYP, AND RECTAL:  Hyperplastic polyps     Assessment / Plan      1. Tubular adenoma of colon  2. Diverticulosis of colon  Colonoscopy 6/2023 fair prep, had 5 small colon polyps removed, 3/5 polyps were tubular adenomas without dysplasia. Sigmoid and rectal polyps were hyperplastic. No family history of colon cancer. Had diverticulosis of the colon noted as well.     High fiber diet  Repeat colonoscopy in 3 - 5 years for surveillance, due 6/2026    3. History of alcohol use  4. Thrombocytopenia  Has history of alcoholism, drank beer daily for approx 40 years. No longer drinking alcohol. No known history of any liver disease. Has been told in the past that his platelets are low. Labs 8/2023 platelets 111,000. Liver enzymes normal. No recent abdominal imaging. BMI 28. Does have history of elevated cholesterol but no diabetes. Suspect underlying alcoholic liver disease and advanced fibrosis, even possible cirrhosis.      US abd to be arranged  Continue to abstain from alcohol use  Mediterranean diet  Work on weight loss, lose 10 lbs in the next 6 months     - US Abdomen Complete; Future    Follow Up:   Follow up dependent on results, he will be called with next step in management.    Yanira Pinon PA-C  [facility] [place]  12/4/2023  16:52 EST    Dictated Utilizing Dragon Dictation: Part of this note may be an electronic transcription/translation of spoken language to printed text using the Dragon Dictation System.

## 2024-04-03 ENCOUNTER — HOSPITAL ENCOUNTER (OUTPATIENT)
Dept: ULTRASOUND IMAGING | Facility: HOSPITAL | Age: 74
Discharge: HOME | End: 2024-04-03
Admitting: PHYSICIAN ASSISTANT
Payer: MEDICARE

## 2024-04-03 DIAGNOSIS — Z87.898 HISTORY OF ALCOHOL USE: ICD-10-CM

## 2024-04-03 DIAGNOSIS — D69.6 THROMBOCYTOPENIA: ICD-10-CM

## 2024-04-03 PROCEDURE — 76700 US EXAM ABDOM COMPLETE: CPT

## (undated) DEVICE — PK SHLDR 20

## (undated) DEVICE — SPNG GZ WOVN 4X4IN 12PLY 10/BX STRL

## (undated) DEVICE — CANNULA THREADED FLEX 8.0 X 72MM: Brand: CLEAR-TRAC

## (undated) DEVICE — QUICK CATCH IN-LINE SUCTION POLYP TRAP IS USED FOR SUCTION RETRIEVAL OF ENDOSCOPICALLY REMOVED POLYPS.

## (undated) DEVICE — CANNULA THREADED FLEX 6.5 X 72MM: Brand: CLEAR-TRAC

## (undated) DEVICE — SHOULDER STABILIZATION KIT,                                    DISPOSABLE 12 PER BOX

## (undated) DEVICE — AMBIENT SUPER MULTIVAC 50 WITH                                    INTEGRATED FINGER SWITCHES IFS: Brand: COBLATION

## (undated) DEVICE — TUBING, SUCTION, 1/4" X 12', STRAIGHT: Brand: MEDLINE

## (undated) DEVICE — GLV SURG SENSICARE GREEN W/ALOE PF LF 8 STRL

## (undated) DEVICE — PATIENT RETURN ELECTRODE, SINGLE-USE, CONTACT QUALITY MONITORING, ADULT, WITH 15 FT (4.5 M) CORD. FOR PATIENTS WEIGHING OVER 33LBS. (15KG): Brand: MEGADYNE

## (undated) DEVICE — LUBE JELLY PK/2.75GM STRL BX/144

## (undated) DEVICE — FIRSTPASS ST SUTURE PASSER, SELF CAPTURE: Brand: FIRSTPASS

## (undated) DEVICE — HYBRID TUBING/CAP SET FOR OLYMPUS® SCOPES: Brand: ERBE

## (undated) DEVICE — SUT ETHLN 3/0 FS1 663G

## (undated) DEVICE — DRAPE,U/ SHT,SPLIT,PLAS,STERIL: Brand: MEDLINE

## (undated) DEVICE — FOOTPRINT ULTRA PK SUTURE ANCHOR 4.5
Type: IMPLANTABLE DEVICE | Site: SHOULDER | Status: NON-FUNCTIONAL
Brand: FOOTPRINT

## (undated) DEVICE — GLV SURG SENSICARE W/ALOE PF LF 7.5 STRL

## (undated) DEVICE — DRSNG SURESITE123 6X8IN

## (undated) DEVICE — CUFF SCD HEMOFORCE SEQ CALF STD MD

## (undated) DEVICE — SINGLE-USE POLYPECTOMY SNARE: Brand: CAPTIVATOR II

## (undated) DEVICE — OCCLUSIVE GAUZE STRIP,3% BISMUTH TRIBROMOPHENATE IN PETROLATUM BLEND: Brand: XEROFORM

## (undated) DEVICE — ENDOSCOPY PORT CONNECTOR FOR OLYMPUS® SCOPES: Brand: ERBE

## (undated) DEVICE — 4.0 MM ELITE STONECUTTER STRAIGHT                                    DISPOSABLE BURRS, MAROON, 10000                                    MAXIMUM RPM, PACKAGED 6 PER BOX, STERILE

## (undated) DEVICE — VLV SXN AIR/H2O ORCAPOD3 1P/U STRL

## (undated) DEVICE — DYONICS 25 PATIENT TUBE SET MUST                                    BE USED WITH 7211007, 12 PER BOX

## (undated) DEVICE — PAD,ABDOMINAL,5"X9",STERILE,LF,1/PK: Brand: MEDLINE INDUSTRIES, INC.

## (undated) DEVICE — Device

## (undated) DEVICE — KT POSTN SCHLEIN

## (undated) DEVICE — 4.5 MM FULL RADIUS STRAIGHT                                    BLADES, POWER/EP-1, YELLOW, PACKAGED                                    6 PER BOX, STERILE: Brand: DYONICS